# Patient Record
Sex: MALE | Race: WHITE | Employment: FULL TIME | URBAN - METROPOLITAN AREA
[De-identification: names, ages, dates, MRNs, and addresses within clinical notes are randomized per-mention and may not be internally consistent; named-entity substitution may affect disease eponyms.]

---

## 2017-01-05 ENCOUNTER — OFFICE VISIT (OUTPATIENT)
Dept: FAMILY MEDICINE | Facility: CLINIC | Age: 27
End: 2017-01-05
Payer: COMMERCIAL

## 2017-01-05 VITALS
OXYGEN SATURATION: 95 % | WEIGHT: 180 LBS | HEIGHT: 71 IN | TEMPERATURE: 97.8 F | DIASTOLIC BLOOD PRESSURE: 70 MMHG | BODY MASS INDEX: 25.2 KG/M2 | HEART RATE: 97 BPM | SYSTOLIC BLOOD PRESSURE: 115 MMHG

## 2017-01-05 DIAGNOSIS — J01.10 ACUTE FRONTAL SINUSITIS, RECURRENCE NOT SPECIFIED: ICD-10-CM

## 2017-01-05 DIAGNOSIS — F98.8 ADD (ATTENTION DEFICIT DISORDER): ICD-10-CM

## 2017-01-05 DIAGNOSIS — F41.1 GAD (GENERALIZED ANXIETY DISORDER): Primary | ICD-10-CM

## 2017-01-05 DIAGNOSIS — F51.04 PSYCHOPHYSIOLOGICAL INSOMNIA: ICD-10-CM

## 2017-01-05 DIAGNOSIS — F33.1 MODERATE EPISODE OF RECURRENT MAJOR DEPRESSIVE DISORDER (H): ICD-10-CM

## 2017-01-05 PROCEDURE — 36415 COLL VENOUS BLD VENIPUNCTURE: CPT | Performed by: INTERNAL MEDICINE

## 2017-01-05 PROCEDURE — 99203 OFFICE O/P NEW LOW 30 MIN: CPT | Performed by: INTERNAL MEDICINE

## 2017-01-05 PROCEDURE — 84443 ASSAY THYROID STIM HORMONE: CPT | Performed by: INTERNAL MEDICINE

## 2017-01-05 RX ORDER — ALPRAZOLAM 0.5 MG
0.5 TABLET ORAL 3 TIMES DAILY PRN
Qty: 60 TABLET | Refills: 0 | Status: SHIPPED | OUTPATIENT
Start: 2017-01-05 | End: 2017-02-07 | Stop reason: DRUGHIGH

## 2017-01-05 RX ORDER — DULOXETIN HYDROCHLORIDE 30 MG/1
CAPSULE, DELAYED RELEASE ORAL
Qty: 60 CAPSULE | Refills: 1 | Status: SHIPPED | OUTPATIENT
Start: 2017-01-05 | End: 2017-02-07 | Stop reason: DRUGHIGH

## 2017-01-05 RX ORDER — LORAZEPAM 1 MG/1
0.5-1 TABLET ORAL
Qty: 30 TABLET | Refills: 0 | Status: SHIPPED | OUTPATIENT
Start: 2017-01-05 | End: 2017-03-16

## 2017-01-05 RX ORDER — LISDEXAMFETAMINE DIMESYLATE 30 MG/1
30 CAPSULE ORAL DAILY
Qty: 30 CAPSULE | Refills: 0 | Status: SHIPPED | OUTPATIENT
Start: 2017-01-05 | End: 2017-02-07 | Stop reason: DRUGHIGH

## 2017-01-05 ASSESSMENT — ENCOUNTER SYMPTOMS
SORE THROAT: 0
DEPRESSION: 1
COUGH: 0
HALLUCINATIONS: 0
TREMORS: 0
INSOMNIA: 1
CHILLS: 0
PALPITATIONS: 0
WEIGHT LOSS: 0
NERVOUS/ANXIOUS: 1
SHORTNESS OF BREATH: 0
FEVER: 0

## 2017-01-05 ASSESSMENT — ANXIETY QUESTIONNAIRES
GAD7 TOTAL SCORE: 18
3. WORRYING TOO MUCH ABOUT DIFFERENT THINGS: NEARLY EVERY DAY
6. BECOMING EASILY ANNOYED OR IRRITABLE: NEARLY EVERY DAY
IF YOU CHECKED OFF ANY PROBLEMS ON THIS QUESTIONNAIRE, HOW DIFFICULT HAVE THESE PROBLEMS MADE IT FOR YOU TO DO YOUR WORK, TAKE CARE OF THINGS AT HOME, OR GET ALONG WITH OTHER PEOPLE: EXTREMELY DIFFICULT
1. FEELING NERVOUS, ANXIOUS, OR ON EDGE: NEARLY EVERY DAY
7. FEELING AFRAID AS IF SOMETHING AWFUL MIGHT HAPPEN: SEVERAL DAYS
2. NOT BEING ABLE TO STOP OR CONTROL WORRYING: NEARLY EVERY DAY
5. BEING SO RESTLESS THAT IT IS HARD TO SIT STILL: MORE THAN HALF THE DAYS

## 2017-01-05 ASSESSMENT — PATIENT HEALTH QUESTIONNAIRE - PHQ9: 5. POOR APPETITE OR OVEREATING: NEARLY EVERY DAY

## 2017-01-05 ASSESSMENT — LIFESTYLE VARIABLES: SUBSTANCE_ABUSE: 0

## 2017-01-05 NOTE — PROGRESS NOTES
"HPI Comments:   Patient has had an estimated 4 year history of generalized anxiety for which he is currently on 60 mg of daily citalopram. He feels that the medication is not working well enough for him. He still has a very difficult time with public speaking. His anxiety also keeps him up at night.    When he is with people, he projects himself very well but when he is alone he said that he feels depressed. Denies suicidal ideation.    Also has a history of attention deficit disorder which is currently responding well to Vyvanse. Has tried Adderall in the past but this made his anxiety worse.    Also complains of right frontal sinus pain with nasal congestion for the past couple weeks. Denies cough or shortness of breath.  Sinus Problem   This is a new problem. The current episode started more than 1 week ago. The problem has not changed since onset.There has been no fever. The pain is moderate. The pain has been constant since onset. Associated symptoms include congestion. Pertinent negatives include no chills, no sore throat, no cough and no shortness of breath.       Past Medical History   Diagnosis Date     Anxiety      Depressive disorder      ADD (attention deficit disorder) 1/5/2017     Moderate episode of recurrent major depressive disorder (H) 1/5/2017       Review of Systems   Constitutional: Positive for malaise/fatigue (from lack of sleep). Negative for fever, chills and weight loss.   HENT: Positive for congestion. Negative for sore throat.    Respiratory: Negative for cough and shortness of breath.    Cardiovascular: Negative for chest pain and palpitations.   Neurological: Negative for tremors.   Psychiatric/Behavioral: Positive for depression. Negative for suicidal ideas, hallucinations and substance abuse. The patient is nervous/anxious and has insomnia.        /70 mmHg  Pulse 97  Temp(Src) 97.8  F (36.6  C) (Tympanic)  Ht 5' 11\" (1.803 m)  Wt 180 lb (81.647 kg)  BMI 25.12 kg/m2  SpO2 " 95%      Physical Exam   Constitutional: He is oriented to person, place, and time. No distress.   Neck: No thyromegaly present.   Cardiovascular: Normal rate, regular rhythm and normal heart sounds.    Pulmonary/Chest: Effort normal and breath sounds normal. No respiratory distress.   Neurological: He is alert and oriented to person, place, and time. GCS score is 15.   Psychiatric: Affect normal.   Anxious appearing   Vitals reviewed.        ICD-10-CM    1. ROSANA (generalized anxiety disorder) F41.1 ALPRAZolam (XANAX) 0.5 MG tablet     DULoxetine (CYMBALTA) 30 MG EC capsule     TSH with free T4 reflex   2. ADD (attention deficit disorder) F98.8 lisdexamfetamine (VYVANSE) 30 MG capsule   3. Moderate episode of recurrent major depressive disorder (H) F33.1 DULoxetine (CYMBALTA) 30 MG EC capsule   4. Psychophysiological insomnia F51.04 LORazepam (ATIVAN) 1 MG tablet   5. Acute frontal sinusitis, recurrence not specified J01.10 amoxicillin-clavulanate (AUGMENTIN) 875-125 MG per tablet         Patient Instructions   Stop Trazodone.  Wean off Citalopram as follows: 2 tablets once a day for 1 week, then 1 tablet once a day for 1 week, then 1 tablet every other day for 1 week, then stop.  Take new prescribed medication (Cymbalta) for your anxiety/depression as directed.  Call doctor if you develop any side effects from the medication/s.  Call doctor if your anxiety/depression persists/worsens, or if you develop new symptoms.    Follow up in 1 month.

## 2017-01-05 NOTE — PROGRESS NOTES
"  SUBJECTIVE:     CC: Kristen Hernandes is an 26 year old male who presents for preventative health visit.     Healthy Habits:    Do you get at least three servings of calcium containing foods daily (dairy, green leafy vegetables, etc.)? {YES/NO, DAIRY INTAKE:190673::\"yes\"}    Amount of exercise or daily activities, outside of work: {AMOUNT EXERCISE:012966}    Problems taking medications regularly {Yes /No default:416345::\"No\"}    Medication side effects: {Yes /No default.:026487::\"No\"}    Have you had an eye exam in the past two years? {YESNOBLANK:284255}    Do you see a dentist twice per year? {YESNOBLANK:815169}    Do you have sleep apnea, excessive snoring or daytime drowsiness?{YESNOBLANK:983292}    {Outside tests to abstract? :306247}    {additional problems to add:312434}    Today's PHQ-2 Score: No flowsheet data found.  {PHQ-2 LOOK IN ASSESSMENTS :313808}  Abuse: Current or Past(Physical, Sexual or Emotional)- {YES/NO/NA:357486}  Do you feel safe in your environment - {YES/NO/NA:688007}    Social History   Substance Use Topics     Smoking status: Never Smoker      Smokeless tobacco: Current User     Alcohol Use: Yes     {ETOH AUDIT:931617}    Last PSA: No results found for: PSA    No results for input(s): CHOL, HDL, LDL, TRIG, CHOLHDLRATIO, NHDL in the last 32775 hours.    Reviewed orders with patient. Reviewed health maintenance and updated orders accordingly - {Yes/No:440805::\"Yes\"}    All Histories reviewed and updated in Epic.  {HISTORY OPTIONS:647224}    ROS:  {MALE ROS-adult preventive care package:238204::\"C: NEGATIVE for fever, chills, change in weight\",\"I: NEGATIVE for worrisome rashes, moles or lesions\",\"E: NEGATIVE for vision changes or irritation\",\"ENT: NEGATIVE for ear, mouth and throat problems\",\"R: NEGATIVE for significant cough or SOB\",\"CV: NEGATIVE for chest pain, palpitations or peripheral edema\",\"GI: NEGATIVE for nausea, abdominal pain, heartburn, or change in bowel habits\",\" male: negative " "for dysuria, hematuria, decreased urinary stream, erectile dysfunction, urethral discharge\",\"M: NEGATIVE for significant arthralgias or myalgia\",\"N: NEGATIVE for weakness, dizziness or paresthesias\",\"P: NEGATIVE for changes in mood or affect\"}    {CHRONICPROBDATA:420541}  OBJECTIVE:     There were no vitals taken for this visit.  EXAM:  {Exam Choices:234761}    ASSESSMENT/PLAN:     {Diag Picklist:408615}    COUNSELING:  {MALE COUNSELING MESSAGES:884449::\"Reviewed preventive health counseling, as reflected in patient instructions\"}    {Blood Pressure - Adult Preventive:334199}     reports that he has never smoked. He uses smokeless tobacco.  {Tobacco Cessation needed for ACO -- Delete if patient is a non-smoker:266419}  Estimated body mass index is 25.12 kg/(m^2) as calculated from the following:    Height as of 12/17/16: 5' 11\" (1.803 m).    Weight as of 12/17/16: 180 lb (81.647 kg).   {Weight Management Plan needed for ACO:343164}    Counseling Resources:  ATP IV Guidelines  Pooled Cohorts Equation Calculator  FRAX Risk Assessment  ICSI Preventive Guidelines  Dietary Guidelines for Americans, 2010  USDA's MyPlate  ASA Prophylaxis  Lung CA Screening    Alejandro Wells MD  [facility]  "

## 2017-01-05 NOTE — Clinical Note
[facility]  [address] [facility]  [address]  [place]  [address]  Tel: [phone]    January 6, 2017    Kristen Hernandes  [address] [facility]   [place] [address]          Good day to you Mr. Hernandes.    Your thyroid function test is normal.      Sincerely,    Alejandro Wells MD/alfredito    Results for orders placed or performed in visit on 01/05/17   TSH with free T4 reflex   Result Value Ref Range    TSH 0.86 0.40 - 4.00 mU/L           Enclosure: Lab Results

## 2017-01-05 NOTE — PATIENT INSTRUCTIONS
Stop Trazodone.  Wean off Citalopram as follows: 2 tablets once a day for 1 week, then 1 tablet once a day for 1 week, then 1 tablet every other day for 1 week, then stop.  Take new prescribed medication (Cymbalta) for your anxiety/depression as directed.  Call doctor if you develop any side effects from the medication/s.  Call doctor if your anxiety/depression persists/worsens, or if you develop new symptoms.    Follow up in 1 month.

## 2017-01-05 NOTE — NURSING NOTE
"Chief Complaint   Patient presents with     Establish Care     Anxiety     pt states that he does have very high anxiety and would like to discuss it       Initial /70 mmHg  Pulse 97  Temp(Src) 97.8  F (36.6  C) (Tympanic)  Ht 5' 11\" (1.803 m)  Wt 180 lb (81.647 kg)  BMI 25.12 kg/m2  SpO2 95% Estimated body mass index is 25.12 kg/(m^2) as calculated from the following:    Height as of this encounter: 5' 11\" (1.803 m).    Weight as of this encounter: 180 lb (81.647 kg).  BP completed using cuff size: large, left arm.  Kiara Conley MA    "

## 2017-01-05 NOTE — MR AVS SNAPSHOT
After Visit Summary   1/5/2017    Kristen Hernandes    MRN: [id number]           Patient Information     Date Of Birth          1990        Visit Information        Provider Department      1/5/2017 1:00 PM Alejandro Wells MD [facility]        Today's Diagnoses     ROSANA (generalized anxiety disorder)    -  1     ADD (attention deficit disorder)         Moderate episode of recurrent major depressive disorder (H)         Psychophysiological insomnia         Acute frontal sinusitis, recurrence not specified           Care Instructions    Stop Trazodone.  Wean off Citalopram as follows: 2 tablets once a day for 1 week, then 1 tablet once a day for 1 week, then 1 tablet every other day for 1 week, then stop.  Take new prescribed medication (Cymbalta) for your anxiety/depression as directed.  Call doctor if you develop any side effects from the medication/s.  Call doctor if your anxiety/depression persists/worsens, or if you develop new symptoms.    Follow up in 1 month.        Follow-ups after your visit        Who to contact     If you have questions or need follow up information about today's clinic visit or your schedule please contact [facility] directly at [phone].  Normal or non-critical lab and imaging results will be communicated to you by MyChart, letter or phone within 4 business days after the clinic has received the results. If you do not hear from us within 7 days, please contact the clinic through [employer] or phone. If you have a critical or abnormal lab result, we will notify you by phone as soon as possible.  Submit refill requests through [employer] or call your pharmacy and they will forward the refill request to us. Please allow 3 business days for your refill to be completed.          Additional Information About Your Visit        MyChart Information     Prowl lets you send messages to your doctor, view your test results, renew your  "prescriptions, schedule appointments and more. To sign up, go to [email or web address] . Click on \"Log in\" on the left side of the screen, which will take you to the Welcome page. Then click on \"Sign up Now\" on the right side of the page.     You will be asked to enter the access code listed below, as well as some personal information. Please follow the directions to create your username and password.     Your access code is: [email or web address]  Expires: 2017  5:31 AM     Your access code will  in 90 days. If you need help or a new code, please call your [facility] clinic or [phone].        Care EveryWhere ID     This is your Care EveryWhere ID. This could be used by other organizations to access your [facility] medical records  [id number]        Your Vitals Were     Pulse Temperature Height BMI (Body Mass Index) Pulse Oximetry       97 97.8  F (36.6  C) (Tympanic) 5' 11\" (1.803 m) 25.12 kg/m2 95%        Blood Pressure from Last 3 Encounters:   17 115/70   16 126/64   10/15/16 114/74    Weight from Last 3 Encounters:   17 180 lb (81.647 kg)   16 180 lb (81.647 kg)   10/15/16 180 lb (81.647 kg)              Today, you had the following     No orders found for display         Today's Medication Changes          These changes are accurate as of: 17  1:36 PM.  If you have any questions, ask your nurse or doctor.               Start taking these medicines.        Dose/Directions    ALPRAZolam 0.5 MG tablet   Commonly known as:  XANAX   Used for:  ROSANA (generalized anxiety disorder)   Started by:  Alejandro Wells MD        Dose:  0.5 mg   Take 1 tablet (0.5 mg) by mouth 3 times daily as needed for anxiety   Quantity:  60 tablet   Refills:  0       amoxicillin-clavulanate 875-125 MG per tablet   Commonly known as:  AUGMENTIN   Used for:  Acute frontal sinusitis, recurrence not specified   Started by:  Alejandro Wells MD        Dose:  1 tablet   Take 1 " tablet by mouth 2 times daily for 10 days   Quantity:  20 tablet   Refills:  0       DULoxetine 30 MG EC capsule   Commonly known as:  CYMBALTA   Used for:  ROSANA (generalized anxiety disorder), Moderate episode of recurrent major depressive disorder (H)   Started by:  Alejandro Wells MD        1 capsule daily for 1 week, then 2 capsules daily   Quantity:  60 capsule   Refills:  1       LORazepam 1 MG tablet   Commonly known as:  ATIVAN   Used for:  Psychophysiological insomnia   Started by:  Alejandro Wells MD        Dose:  0.5-1 mg   Take 0.5-1 tablets (0.5-1 mg) by mouth nightly as needed for anxiety Take 30 minutes prior to departure.  Do not operate a vehicle after taking this medication   Quantity:  30 tablet   Refills:  0         Stop taking these medicines if you haven't already. Please contact your care team if you have questions.     CITALOPRAM HYDROBROMIDE PO   Stopped by:  Alejandro Wells MD           TRAZODONE HCL PO   Stopped by:  Alejandro Wells MD                Where to get your medicines      These medications [facility] [address] [place] [address] [address] [facility]  [address] [place] [id number]    Hours:  24-hours Phone:  [phone]    - amoxicillin-clavulanate 875-125 MG per tablet  - DULoxetine 30 MG EC capsule      Some of these will need a paper prescription and others can be bought over the counter.  Ask your nurse if you have questions.     Bring a paper prescription for each of these medications    - ALPRAZolam 0.5 MG tablet  - lisdexamfetamine 30 MG capsule  - LORazepam 1 MG tablet             Primary Care Provider    Rylan Sanchez MD       No address on file        Thank you!     Thank you for choosing [facility]  for your care. Our goal is always to provide you with excellent care. Hearing back from our patients is one way we can continue to improve our  services. Please take a few minutes to complete the written survey that you may receive in the mail after your visit with us. Thank you!             Your Updated Medication List - Protect others around you: Learn how to safely use, store and throw away your medicines at www.disposemymeds.org.          This list is accurate as of: 1/5/17  1:36 PM.  Always use your most recent med list.                   Brand Name Dispense Instructions for use    ALPRAZolam 0.5 MG tablet    XANAX    60 tablet    Take 1 tablet (0.5 mg) by mouth 3 times daily as needed for anxiety       amoxicillin-clavulanate 875-125 MG per tablet    AUGMENTIN    20 tablet    Take 1 tablet by mouth 2 times daily for 10 days       DULoxetine 30 MG EC capsule    CYMBALTA    60 capsule    1 capsule daily for 1 week, then 2 capsules daily       lisdexamfetamine 30 MG capsule    VYVANSE    30 capsule    Take 1 capsule (30 mg) by mouth daily       loratadine 10 MG tablet    CLARITIN     Take 10 mg by mouth daily       LORazepam 1 MG tablet    ATIVAN    30 tablet    Take 0.5-1 tablets (0.5-1 mg) by mouth nightly as needed for anxiety Take 30 minutes prior to departure.  Do not operate a vehicle after taking this medication

## 2017-01-06 LAB — TSH SERPL DL<=0.005 MIU/L-ACNC: 0.86 MU/L (ref 0.4–4)

## 2017-01-06 ASSESSMENT — ANXIETY QUESTIONNAIRES: GAD7 TOTAL SCORE: 18

## 2017-01-06 ASSESSMENT — PATIENT HEALTH QUESTIONNAIRE - PHQ9: SUM OF ALL RESPONSES TO PHQ QUESTIONS 1-9: 18

## 2017-01-15 ENCOUNTER — HOSPITAL ENCOUNTER (EMERGENCY)
Facility: CLINIC | Age: 27
Discharge: HOME | End: 2017-01-15
Attending: EMERGENCY MEDICINE | Admitting: EMERGENCY MEDICINE
Payer: COMMERCIAL

## 2017-01-15 VITALS
OXYGEN SATURATION: 98 % | SYSTOLIC BLOOD PRESSURE: 120 MMHG | TEMPERATURE: 98.1 F | HEIGHT: 71 IN | RESPIRATION RATE: 18 BRPM | DIASTOLIC BLOOD PRESSURE: 95 MMHG

## 2017-01-15 DIAGNOSIS — Z78.9 ALCOHOL USE: ICD-10-CM

## 2017-01-15 DIAGNOSIS — R42 DIZZINESS: ICD-10-CM

## 2017-01-15 LAB
ANION GAP SERPL CALCULATED.3IONS-SCNC: 8 MMOL/L (ref 3–14)
BUN SERPL-MCNC: 11 MG/DL (ref 7–30)
CALCIUM SERPL-MCNC: 8.7 MG/DL (ref 8.5–10.1)
CHLORIDE SERPL-SCNC: 105 MMOL/L (ref 94–109)
CO2 SERPL-SCNC: 27 MMOL/L (ref 20–32)
CREAT SERPL-MCNC: 0.94 MG/DL (ref 0.66–1.25)
ETHANOL SERPL-MCNC: 0.14 G/DL
GFR SERPL CREATININE-BSD FRML MDRD: NORMAL ML/MIN/1.7M2
GLUCOSE BLDC GLUCOMTR-MCNC: 84 MG/DL (ref 70–99)
GLUCOSE SERPL-MCNC: 98 MG/DL (ref 70–99)
POTASSIUM SERPL-SCNC: 3.5 MMOL/L (ref 3.4–5.3)
SODIUM SERPL-SCNC: 140 MMOL/L (ref 133–144)

## 2017-01-15 PROCEDURE — 96374 THER/PROPH/DIAG INJ IV PUSH: CPT

## 2017-01-15 PROCEDURE — 25000128 H RX IP 250 OP 636: Performed by: EMERGENCY MEDICINE

## 2017-01-15 PROCEDURE — 00000146 ZZHCL STATISTIC GLUCOSE BY METER IP

## 2017-01-15 PROCEDURE — 99284 EMERGENCY DEPT VISIT MOD MDM: CPT | Mod: 25

## 2017-01-15 PROCEDURE — 25000125 ZZHC RX 250: Performed by: EMERGENCY MEDICINE

## 2017-01-15 PROCEDURE — 80048 BASIC METABOLIC PNL TOTAL CA: CPT | Performed by: EMERGENCY MEDICINE

## 2017-01-15 PROCEDURE — 96361 HYDRATE IV INFUSION ADD-ON: CPT

## 2017-01-15 PROCEDURE — 80320 DRUG SCREEN QUANTALCOHOLS: CPT | Performed by: EMERGENCY MEDICINE

## 2017-01-15 RX ORDER — ONDANSETRON 2 MG/ML
4 INJECTION INTRAMUSCULAR; INTRAVENOUS
Status: DISCONTINUED | OUTPATIENT
Start: 2017-01-15 | End: 2017-01-15 | Stop reason: HOSPADM

## 2017-01-15 RX ADMIN — SODIUM CHLORIDE 1000 ML: 9 INJECTION, SOLUTION INTRAVENOUS at 02:59

## 2017-01-15 RX ADMIN — ONDANSETRON HYDROCHLORIDE 4 MG: 2 SOLUTION INTRAMUSCULAR; INTRAVENOUS at 02:59

## 2017-01-15 NOTE — ED PROVIDER NOTES
"  History     Chief Complaint:  \"I feel weird\"    HPI   Kristen Hernandes is a 26 year old male with a hx of sinus infection, currently on antibiotics day 10/10 of Augmentin, who presents with concern for \"feeling weird\" after drinking alcohol. He reports feeling anxious, nauseated and dizzy. He denies other substance use and feels intoxicated from alcohol.  He believes his symptoms are caused by a reaction to the antibiotic and alcohol. He took an Uber to the ED.  He states that his sinusitis symptoms have improved with treatment.    Allergies:  The patient has no known drug allergies.     Medications:    Amoxicillin-clavulanate  lisdexamfetamine  Alprazolam  Lorazepam  Duloxetine  Loratadine    Past Medical History:    ROSANA  Depressive disorder  ADHD   Psychophysiological insomnia    Past Surgical History:    Tonsillectomy    Family History:    No family history on file.    Social History:  Relationship status: single  Tobacco use: negative   Alcohol use: socially  The patient presents alone.      Review of Systems   All other systems reviewed and are negative.    Physical Exam   First Vitals:  /95 mmHg  Resp 18   SpO2 98%  P 109  Temp: 36.7    Physical Exam  General: well groomed male sitting upright  HENT: mucous membranes moist, OP clear, face and forehead nontender  Eyes: PERRL without nystagmus  CV: extremities well perfused, regular rhythm, mildly tachycardic without audible murmur  Resp: normal effort, clear throughout  GI: abdomen soft and nontender, no guarding  MSK: no bony tenderness   Skin: appropriately warm and dry  Neuro: alert, clear speech, oriented, normal tone in extremities, ambulatory  Psych: slightly anxious, cooperative, denies feeling suicidal, no evidence of hallucinations        Emergency Department Course     Laboratory:  Laboratory findings were communicated with the patient who voiced understanding of the findings.  EtOH level: 0.14  BMP: Creatinine 0.94    Interventions:  0259: " Zofran 4mg, IV  0259: NS 1,000 mL IV      Emergency Department Course:  Nursing notes and vitals reviewed.  I performed an exam of the patient as documented above.   The patient was placed on continuous pulse oximetry and cardiac monitoring.  A peripheral IV was established.  IV was inserted and blood was drawn for laboratory testing, results above.     At 0405 the patient was rechecked and was updated on the results of his laboratory studies. The patient feels comfortable and requests to be discharged.     I discussed the treatment plan with the patient. They expressed understanding of this plan and consented to discharge. They will be discharged home with instructions for care and follow up. In addition, the patient will return to the emergency department if their symptoms persist, worsen, if new symptoms arise or if there is any concern.  All questions were answered.    I personally reviewed the laboratory results with the patient and answered all related questions prior to discharge.    Impression & Plan      Medical Decision Making:  This 26 year old with history of anxiety who was put on Augmentin for sinusitis earlier this month, presents with concern for feeling weird after drinking a fair bit of alcohol while out with friends. He is concerned about possible interaction with Augmentin although I think a serious specific reaction or interaction is unlikely after reviewing an electronic medical reference for this drug. His exam is benign, he initially felt slightly drunk but is acting calm and cooperatively and is clinically sober such that I trust him to go home in an er cab tonight. Screening electrolytes are benign and there is no sign of metabolic derangement contributing to his symptoms. No focal neurologic signs or symptoms to suggest an acute neurovascular process such as stroke. He is on a sinus rhythm on the monitor.  Symptoms are not orthostatic.  I think that he can safely be discharged and he  fully agrees.    Diagnosis:    ICD-10-CM    1. Dizziness R42    2. Alcohol use Z78.9        Disposition:   Discharge home    Discharge Medications:  New Prescriptions    No medications on file       Scribe Disclosure:  I, Therese Isaac, am serving as a scribe at 2:41 AM on 1/15/2017 to document services personally performed by Brian Gunderson MD, based on my observations and the provider's statements to me.        Brian Gunderson MD  01/15/17 0546

## 2017-01-15 NOTE — ED AVS SNAPSHOT
Emergency Department    [address] [facility] [address]    Phone:  [phone]    Fax:  [phone]                                       Kristen Hernandes   MRN: [id number]    Department:   Emergency Department   Date of Visit:  1/15/2017           After Visit Summary Signature Page     I have received my discharge instructions, and my questions have been answered. I have discussed any challenges I see with this plan with the nurse or doctor.    ..........................................................................................................................................  Patient/Patient Representative Signature      ..........................................................................................................................................  Patient Representative Print Name and Relationship to Patient    ..................................................               ................................................  Date                                            Time    ..........................................................................................................................................  Reviewed by Signature/Title    ...................................................              ..............................................  Date                                                            Time

## 2017-01-15 NOTE — ED AVS SNAPSHOT
Emergency Department    [address] [facility] [address]    Phone:  [phone]    Fax:  [phone]                                       Kristen Hernandes   MRN: [id number]    Department:   Emergency Department   Date of Visit:  1/15/2017           Patient Information     Date Of Birth          1990        Your diagnoses for this visit were:     Dizziness     Alcohol use        You were seen by Brian Gunderson MD.      Follow-up Information     Call your regular clinic.    Why:  As needed        Follow up with  Emergency Department.    Specialty:  EMERGENCY MEDICINE    Why:  As needed for crisis    Contact information:    [address] [facility] [address] [phone]      Discharge References/Attachments     ALCOHOL INTOXICATION (ENGLISH)      Future Appointments        Provider Department Dept Phone Center    2/7/2017 11:00 AM Alejandro Wells MD [facility] [phone]       24 Hour Appointment Hotline       To make an appointment [facility], call [phone] ([phone]). If you don't have a family doctor or clinic, we will help you find one. [facility] are conveniently located to serve the needs of you and your family.             Review of your medicines      Our records show that you are taking the medicines listed below. If these are incorrect, please call your family doctor or clinic.        Dose / Directions Last dose taken    ALPRAZolam 0.5 MG tablet   Commonly known as:  XANAX   Dose:  0.5 mg   Quantity:  60 tablet        Take 1 tablet (0.5 mg) by mouth 3 times daily as needed for anxiety   Refills:  0        * AUGMENTIN 875-125 MG per tablet   Dose:  1 tablet   Generic drug:  amoxicillin-clavulanate        Take 1 tablet by mouth 2 times daily   Refills:  0        * amoxicillin-clavulanate 875-125 MG per tablet   Commonly known as:  AUGMENTIN   Dose:  1 tablet   Quantity:  20 tablet        Take 1  tablet by mouth 2 times daily for 10 days   Refills:  0        DULoxetine 30 MG EC capsule   Commonly known as:  CYMBALTA   Quantity:  60 capsule        1 capsule daily for 1 week, then 2 capsules daily   Refills:  1        lisdexamfetamine 30 MG capsule   Commonly known as:  VYVANSE   Dose:  30 mg   Quantity:  30 capsule        Take 1 capsule (30 mg) by mouth daily   Refills:  0        loratadine 10 MG tablet   Commonly known as:  CLARITIN   Dose:  10 mg        Take 10 mg by mouth daily   Refills:  0        LORazepam 1 MG tablet   Commonly known as:  ATIVAN   Dose:  0.5-1 mg   Quantity:  30 tablet        Take 0.5-1 tablets (0.5-1 mg) by mouth nightly as needed for anxiety Take 30 minutes prior to departure.  Do not operate a vehicle after taking this medication   Refills:  0        * Notice:  This list has 2 medication(s) that are the same as other medications prescribed for you. Read the directions carefully, and ask your doctor or other care provider to review them with you.            Procedures and tests performed during your visit     Alcohol ethyl    Basic metabolic panel    Glucose by meter      Orders Needing Specimen Collection     None      Pending Results     No orders found from 1/14/2017 to 1/16/2017.            Pending Culture Results     No orders found from 1/14/2017 to 1/16/2017.       Test Results from your hospital stay           1/15/2017  3:38 AM - Interface, "[employer] Results      Component Results     Component Value Ref Range & Units Status    Sodium 140 133 - 144 mmol/L Final    Potassium 3.5 3.4 - 5.3 mmol/L Final    Chloride 105 94 - 109 mmol/L Final    Carbon Dioxide 27 20 - 32 mmol/L Final    Anion Gap 8 3 - 14 mmol/L Final    Glucose 98 70 - 99 mg/dL Final    Urea Nitrogen 11 7 - 30 mg/dL Final    Creatinine 0.94 0.66 - 1.25 mg/dL Final    GFR Estimate >90  Non  GFR Calc   >60 mL/min/1.7m2 Final    GFR Estimate If Black >90   GFR Calc   >60 mL/min/1.7m2  Final    Calcium 8.7 8.5 - 10.1 mg/dL Final         1/15/2017  3:38 AM - Interface, [employer] Results      Component Results     Component Value Ref Range & Units Status    Ethanol g/dL 0.14 (H) <0.01 g/dL Final         1/15/2017  3:01 AM - Interface, [employer] Results      Component Results     Component Value Ref Range & Units Status    Glucose 84 70 - 99 mg/dL Final                Clinical Quality Measure: Blood Pressure Screening     Your blood pressure was checked while you were in the emergency department today. The last reading we obtained was  BP: (!) 120/95 mmHg . Please read the guidelines below about what these numbers mean and what you should do about them.  If your systolic blood pressure (the top number) is less than 120 and your diastolic blood pressure (the bottom number) is less than 80, then your blood pressure is normal. There is nothing more that you need to do about it.  If your systolic blood pressure (the top number) is 120-139 or your diastolic blood pressure (the bottom number) is 80-89, your blood pressure may be higher than it should be. You should have your blood pressure rechecked within a year by a primary care provider.  If your systolic blood pressure (the top number) is 140 or greater or your diastolic blood pressure (the bottom number) is 90 or greater, you may have high blood pressure. High blood pressure is treatable, but if left untreated over time it can put you at risk for heart attack, stroke, or kidney failure. You should have your blood pressure rechecked by a primary care provider within the next 4 weeks.  If your provider in the emergency department today gave you specific instructions to follow-up with your doctor or provider even sooner than that, you should follow that instruction and not wait for up to 4 weeks for your follow-up visit.        Thank you for choosing [facility]       Thank you for choosing [facility] for your care. Our goal is always to provide you with  "excellent care. Hearing back from our patients is one way we can continue to improve our services. Please take a few minutes to complete the written survey that you may receive in the mail after you visit with us. Thank you!        [employer] Information     [employer] lets you send messages to your doctor, view your test results, renew your prescriptions, schedule appointments and more. To sign up, go to [email or web address] . Click on \"Log in\" on the left side of the screen, which will take you to the Welcome page. Then click on \"Sign up Now\" on the right side of the page.     You will be asked to enter the access code listed below, as well as some personal information. Please follow the directions to create your username and password.     Your access code is: [email or web address]  Expires: 4/15/2017  4:18 AM     Your access code will  in 90 days. If you need help or a new code, please call your [place] clinic or [phone].        Care EveryWhere ID     This is your Care EveryWhere ID. This could be used by other organizations to access your [facility] medical records  [id number]        After Visit Summary       This is your record. Keep this with you and show to your community pharmacist(s) and doctor(s) at your next visit.                  "

## 2017-01-21 ENCOUNTER — HOSPITAL ENCOUNTER (EMERGENCY)
Facility: CLINIC | Age: 27
Discharge: HOME | End: 2017-01-21
Attending: EMERGENCY MEDICINE | Admitting: EMERGENCY MEDICINE
Payer: COMMERCIAL

## 2017-01-21 VITALS
TEMPERATURE: 98 F | WEIGHT: 180 LBS | RESPIRATION RATE: 18 BRPM | HEART RATE: 70 BPM | OXYGEN SATURATION: 98 % | HEIGHT: 71 IN | SYSTOLIC BLOOD PRESSURE: 122 MMHG | DIASTOLIC BLOOD PRESSURE: 70 MMHG | BODY MASS INDEX: 25.2 KG/M2

## 2017-01-21 DIAGNOSIS — R11.0 NAUSEA: ICD-10-CM

## 2017-01-21 DIAGNOSIS — Z78.9 ALCOHOL USE: ICD-10-CM

## 2017-01-21 DIAGNOSIS — E86.0 DEHYDRATION: ICD-10-CM

## 2017-01-21 DIAGNOSIS — R42 DIZZINESS: ICD-10-CM

## 2017-01-21 LAB
ALCOHOL BREATH TEST: 0.09 (ref 0–0.01)
INTERPRETATION ECG - MUSE: NORMAL

## 2017-01-21 PROCEDURE — 82075 ASSAY OF BREATH ETHANOL: CPT

## 2017-01-21 PROCEDURE — 93005 ELECTROCARDIOGRAM TRACING: CPT

## 2017-01-21 PROCEDURE — 99284 EMERGENCY DEPT VISIT MOD MDM: CPT

## 2017-01-21 PROCEDURE — 25000125 ZZHC RX 250: Performed by: EMERGENCY MEDICINE

## 2017-01-21 RX ORDER — ONDANSETRON 4 MG/1
4 TABLET, ORALLY DISINTEGRATING ORAL ONCE
Status: COMPLETED | OUTPATIENT
Start: 2017-01-21 | End: 2017-01-21

## 2017-01-21 RX ORDER — ONDANSETRON 4 MG/1
4 TABLET, ORALLY DISINTEGRATING ORAL EVERY 8 HOURS PRN
Qty: 10 TABLET | Refills: 0 | Status: SHIPPED | OUTPATIENT
Start: 2017-01-21 | End: 2017-01-24

## 2017-01-21 RX ADMIN — ONDANSETRON 4 MG: 4 TABLET, ORALLY DISINTEGRATING ORAL at 02:00

## 2017-01-21 ASSESSMENT — ENCOUNTER SYMPTOMS
ABDOMINAL PAIN: 0
APPETITE CHANGE: 1
SHORTNESS OF BREATH: 0
DIZZINESS: 1
NAUSEA: 1
VOMITING: 0
LIGHT-HEADEDNESS: 1

## 2017-01-21 NOTE — ED NOTES
Orthostatics done on pt. Results charted in orthostatic vitals and MD notified of results. Pt steady on feet. Pt hooked up to continuous cardiac monitor. Pt given blanket and call light. Denies any further needs at this time.

## 2017-01-21 NOTE — ED AVS SNAPSHOT
Emergency Department    [address] [facility] [address]    Phone:  [phone]    Fax:  [phone]                                       Kristen Hernandes   MRN: [id number]    Department:   Emergency Department   Date of Visit:  1/21/2017           After Visit Summary Signature Page     I have received my discharge instructions, and my questions have been answered. I have discussed any challenges I see with this plan with the nurse or doctor.    ..........................................................................................................................................  Patient/Patient Representative Signature      ..........................................................................................................................................  Patient Representative Print Name and Relationship to Patient    ..................................................               ................................................  Date                                            Time    ..........................................................................................................................................  Reviewed by Signature/Title    ...................................................              ..............................................  Date                                                            Time

## 2017-01-21 NOTE — ED AVS SNAPSHOT
Emergency Department    [address] [facility] [address]    Phone:  [phone]    Fax:  [phone]                                       Kristen Hernandes   MRN: [id number]    Department:   Emergency Department   Date of Visit:  1/21/2017           Patient Information     Date Of Birth          1990        Your diagnoses for this visit were:     Alcohol use     Dehydration     Nausea     Dizziness        You were seen by Shane Mccarthy MD.      Follow-up Information     Follow up with Sohail Savage MD. Schedule an appointment as soon as possible for a visit in 1 week.    Specialty:  Family Practice    Why:  your doctor or Dr. Savage next week if not feeling better    Contact information:    [employer]  [address]  [facility] [address] [phone]          Follow up with  Emergency Department.    Specialty:  EMERGENCY MEDICINE    Why:  If symptoms worsen    Contact information:    [address] [facility] [address] [phone]        Discharge Instructions         Dehydration (Adult)  Dehydration occurs when your body loses too much fluid. This may be the result of prolonged vomiting or diarrhea, excessive sweating, or a high fever. It may also happen if you don t drink enough fluid when you re sick or out in the heat. Misuse of diuretics (water pills) can also be a cause.  Symptoms include thirst and decreased urine output. You may also feel dizzy, weak, fatigued, or very drowsy. The diet described below is usually enough to treat dehydration. In some cases, you may need medicine.  Home care    Drink at least 12 8-ounce glasses of fluid every day to resolve the dehydration. Fluid may include water; orange juice; lemonade; apple, grape, or cranberry juice; clear fruit drinks; electrolyte replacement and sports drinks; and teas and coffee without caffeine. If you have been diagnosed with a kidney disease, ask your doctor how much and what types of fluids  you should drink to prevent dehydration. If you have kidney disease, fluid can build up in the body. This can be dangerous to your health.     If you have a fever, muscle aches, or a headache as a result of a cold or flu, you may take acetaminophen or ibuprofen, unless another medicine was prescribed. If you have chronic liver or kidney disease, or have ever had a stomach ulcer or gastrointestinal bleeding, talk with your health care provider before using these medicines. Don't take aspirin if you are younger than 18 and have a fever. Aspirin raises the chance for severe liver injury.  Follow-up care  Follow up with your health care provider, or as advised.  When to seek medical advice  Call your health care provider right away if any of these occur:    Continued vomiting    Frequent diarrhea (more than 5 times a day); blood (red or black color) or mucus in diarrhea    Blood in vomit or stool    Swollen abdomen or increasing abdominal pain    Weakness, dizziness, or fainting    Unusual drowsiness or confusion    Reduced urine output or extreme thirst    Fever of 100.4 F (34 C) or higher     0116-3353 [employer]. [address] [place] [address]. All rights reserved. This information is not intended as a substitute for professional medical care. Always follow your healthcare professional's instructions.          Possible Causes of Dizziness or Fainting  Dizziness and fainting can have many causes. Below are some examples of possible causes your health care provider will look to rule out.    Benign Paroxysmal Positional Vertigo (BPPV)  BPPV results when calcium crystals inside the inner ear shift into the wrong position. BPPV causes episodes of vertigo (a spinning sensation). Episodes most often occur when the head is moved in a certain way.  Infection or Inflammation  The semicircular canals of the ear may become infected or inflamed. In this case, they can send the wrong balance signals. This  can cause vertigo.  Meniere s Disease  Meniere s disease happens when there is too much fluid in the semicircular canals. This can cause vertigo. It also can cause hearing problems and buzzing or ringing in the ears (called tinnitus). You may also have a feeling of pressure or fullness in the ear.  Syncope  Syncope is fainting that occurs when the brain doesn t get enough oxygen-rich blood. It can be caused by low heart rate or low blood pressure. This is called vasovagal syncope. It can also be caused by sitting or standing up too quickly. This is called orthostatic hypotension. Syncope may also be due to a heart valve problem, an abnormal heart rhythm, or other heart problems. Dizziness can also occur from stroke, hemorrhage in the brain, or other problems in the brain. Your doctor may do certain tests to rule out these conditions.  Other Causes  Other causes include:    Medications. Certain medications can cause dizziness and even fainting. In some cases, stopping a medication too quickly can lead to withdrawal symptoms, including dizziness and fainting.    Anxiety. Being anxious can lead to breathing changes, such as hyperventilation. These can lead to dizziness and fainting.  Additional causes for dizziness and fainting also exist. Talk to your doctor for more information.          8792-4315 [employer]. [address] [place] [address], [place] [address]. All rights reserved. This information is not intended as a substitute for professional medical care. Always follow your healthcare professional's instructions.          Discharge References/Attachments     NAUSEA AND VOMITING, HOW TO CONTROL (ENGLISH)    ALCOHOL INTOXICATION (ENGLISH)      Future Appointments        Provider Department Dept Phone Center    2/7/2017 11:00 AM Alejandro Wells MD [facility] [phone]       24 Hour Appointment Hotline       To make an appointment at any [facility], call [phone]  ([phone]). If you don't have a family doctor or clinic, we will help you find one. [facility] clinics are conveniently located to serve the needs of you and your family.             Review of your medicines      START taking        Dose / Directions Last dose taken    ondansetron 4 MG ODT tab   Commonly known as:  ZOFRAN ODT   Dose:  4 mg   Quantity:  10 tablet        Take 1 tablet (4 mg) by mouth every 8 hours as needed for nausea   Refills:  0          Our records show that you are taking the medicines listed below. If these are incorrect, please call your family doctor or clinic.        Dose / Directions Last dose taken    ALPRAZolam 0.5 MG tablet   Commonly known as:  XANAX   Dose:  0.5 mg   Quantity:  60 tablet        Take 1 tablet (0.5 mg) by mouth 3 times daily as needed for anxiety   Refills:  0        AUGMENTIN 875-125 MG per tablet   Dose:  1 tablet   Generic drug:  amoxicillin-clavulanate        Take 1 tablet by mouth 2 times daily   Refills:  0        DULoxetine 30 MG EC capsule   Commonly known as:  CYMBALTA   Quantity:  60 capsule        1 capsule daily for 1 week, then 2 capsules daily   Refills:  1        lisdexamfetamine 30 MG capsule   Commonly known as:  VYVANSE   Dose:  30 mg   Quantity:  30 capsule        Take 1 capsule (30 mg) by mouth daily   Refills:  0        loratadine 10 MG tablet   Commonly known as:  CLARITIN   Dose:  10 mg        Take 10 mg by mouth daily   Refills:  0        LORazepam 1 MG tablet   Commonly known as:  ATIVAN   Dose:  0.5-1 mg   Quantity:  30 tablet        Take 0.5-1 tablets (0.5-1 mg) by mouth nightly as needed for anxiety Take 30 minutes prior to departure.  Do not operate a vehicle after taking this medication   Refills:  0                Prescriptions were sent or printed at these locations (1 Prescription)                   Other Prescriptions                Printed at Department/Unit printer (1 of 1)         ondansetron (ZOFRAN ODT) 4 MG ODT tab                 Procedures and tests performed during your visit     Alcohol breath test POCT    Cardiac Continuous Monitoring    EKG 12 lead    Orthostatic blood pressure and pulse      Orders Needing Specimen Collection     None      Pending Results     No orders found from 1/20/2017 to 1/22/2017.            Pending Culture Results     No orders found from 1/20/2017 to 1/22/2017.       Test Results from your hospital stay           1/21/2017  2:59 AM - Sandip Broussard RN      Component Results     Component Value Ref Range & Units Status    Alcohol Breath Test 0.089 (A) 0.00 - 0.01                 Clinical Quality Measure: Blood Pressure Screening     Your blood pressure was checked while you were in the emergency department today. The last reading we obtained was  BP: 125/76 mmHg . Please read the guidelines below about what these numbers mean and what you should do about them.  If your systolic blood pressure (the top number) is less than 120 and your diastolic blood pressure (the bottom number) is less than 80, then your blood pressure is normal. There is nothing more that you need to do about it.  If your systolic blood pressure (the top number) is 120-139 or your diastolic blood pressure (the bottom number) is 80-89, your blood pressure may be higher than it should be. You should have your blood pressure rechecked within a year by a primary care provider.  If your systolic blood pressure (the top number) is 140 or greater or your diastolic blood pressure (the bottom number) is 90 or greater, you may have high blood pressure. High blood pressure is treatable, but if left untreated over time it can put you at risk for heart attack, stroke, or kidney failure. You should have your blood pressure rechecked by a primary care provider within the next 4 weeks.  If your provider in the emergency department today gave you specific instructions to follow-up with your doctor or provider even sooner than that, you should follow that  "instruction and not wait for up to 4 weeks for your follow-up visit.        Thank you for choosing [facility]       Thank you for choosing [facility] for your care. Our goal is always to provide you with excellent care. Hearing back from our patients is one way we can continue to improve our services. Please take a few minutes to complete the written survey that you may receive in the mail after you visit with us. Thank you!        [employer] Information     mention lets you send messages to your doctor, view your test results, renew your prescriptions, schedule appointments and more. To sign up, go to [email or web address] . Click on \"Log in\" on the left side of the screen, which will take you to the Welcome page. Then click on \"Sign up Now\" on the right side of the page.     You will be asked to enter the access code listed below, as well as some personal information. Please follow the directions to create your username and password.     Your access code is: [email or web address]  Expires: 4/15/2017  4:18 AM     Your access code will  in 90 days. If you need help or a new code, please call your [facility] clinic or [phone].        Care EveryWhere ID     This is your Care EveryWhere ID. This could be used by other organizations to access your [facility] medical records  [id number]        After Visit Summary       This is your record. Keep this with you and show to your community pharmacist(s) and doctor(s) at your next visit.                  "

## 2017-01-21 NOTE — ED PROVIDER NOTES
"  History     Chief Complaint:  Medication Reaction    HPI   Kristen Hernandes is a 26 year old male who presents to the emergency department today for evaluation after drinking alcohol while taking Amoxicillin. The patient reports that he is currently taking Amoxicillin for his sinus infection and he has two days left in his antibiotic course. The patient reports that he was drinking earlier this evening, but he is adamant that it was \"not more than usual\" and he states that he is \"very consistent\" with the amount of alcohol that he consumes each night and weekend. He currently states that he feels lightheaded and that he \"went out of it and then came back in\" and that he has never felt like this before. He also states that he is nauseous but reports that he has not vomited. The patient reports that he was seen here last weekend on 01/15/2017 with a similar lightheaded feeling and he had a laboratory workup as per below. At that visit, he was told that the amoxicillin should not be causing him any sort of dizziness. The patient currently denies any abdominal pain, ear pain, chest pain, or shortness of breath. He states that his sinusitis gives him \"a decent amount of pain\" from time to time and that he had sinus surgery several years ago. The patient states that he has been eating less while he has been on his antibiotics. He also reports that he worked out on Friday evening and he may be a little dehydrated. Of note, the patient states that he was on Trazodone to help him sleep but he was recently switched to Ativan which he takes every night, however he has not yet taken Ativan this evening.     01/15/2017 - Laboratory:  Ethyl Alcohol Level: 0.14  BMP: Creatinine 0.94    Allergies:  No Known Drug Allergies    Medications:    Augment  Vyvanse  Xanax  Ativan  Cymbalta  Claritin     Past Medical History:    Anxiety  Depressive disorder  Add  Moderate episode of recurrent major depressive disorder    Past Surgical " "History:    Tonsillectomy     Family History:    No family history on file.    Social History:  Smoking Status: Never Smoker  Smokeless Tobacco: Current User  Alcohol Use: Positive  Marital Status:  Single [1]     Review of Systems   Constitutional: Positive for appetite change (Reduced while on antibiotics).   HENT: Negative for ear pain.    Respiratory: Negative for shortness of breath.    Cardiovascular: Negative for chest pain.   Gastrointestinal: Positive for nausea. Negative for vomiting and abdominal pain.   Neurological: Positive for dizziness and light-headedness.   All other systems reviewed and are negative.    Physical Exam   Vitals:  Patient Vitals for the past 24 hrs:   BP Temp Temp src Heart Rate Resp SpO2 Height Weight   01/21/17 0238 - - - 76 17 - - -   01/21/17 0132 125/76 mmHg 98  F (36.7  C) Oral 88 16 97 % 1.803 m (5' 11\") 81.647 kg (180 lb)       Physical Exam  Constitutional:  Appears well-developed and well-nourished. Cooperative.   HENT:   Head:    Atraumatic.   Mouth/Throat:   Oropharynx is without erythema or exudate. Dry oral mucosa.  Eyes:    Conjunctivae normal and EOM are normal.      Pupils are equal, round, and reactive to light.   Neck:    Normal range of motion. Neck supple.   Cardiovascular:  Mildly tachycardic, regular rhythm, normal heart sounds and radial and    dorsalis pedis pulses are 2+ and symmetric.  No murmurs, rubs, or gallops.  Pulmonary/Chest:  Effort normal and breath sounds normal.   Abdominal:   Soft. Bowel sounds are normal.      No splenomegaly or hepatomegaly. No tenderness. No rebound.   Musculoskeletal:  Normal range of motion. No edema and no tenderness.   Neurological:  Alert. Normal strength. No cranial nerve deficit. GCS 15.  Skin:    Skin is warm and dry.   Psychiatric:   Normal mood and affect.     Emergency Department Course     ECG:  ECG taken at 0205, ECG read at 0208  Normal sinus rhythm  Normal ECG  Rate 84 bpm. GA interval 148 ms. QRS duration 78 " ms. QT/QTc 372/439 ms. P-R-T axes 55 36 27.    Laboratory:  Laboratory findings were communicated with the patient who voiced understanding of the findings.    Alcohol Breath Test (Collected: 0226): 0.089 (A)    Interventions:  0200 Zofran-ODT 4 mg PO    Emergency Department Course:  Nursing notes and vitals reviewed.  I performed an exam of the patient as documented above.   I discussed the treatment plan with the patient. They expressed understanding of this plan and consented to discharge. They will be discharged home with instructions for care and follow up. In addition, the patient will return to the emergency department if their symptoms persist, worsen, if new symptoms arise or if there is any concern.  All questions were answered.  I personally reviewed the alcohol breath test results with the patient and answered all related questions prior to discharge.  Impression & Plan      Medical Decision Making:  Patient presents complaining of feeling lightheaded and dizzy after drinking alcohol tonight. He said he had a similar feeling last week when he drank alcohol. He is concerned because he is being treated for a sinus infection and has been taking Augmentin, that he is having a medication reaction. On exam, the patient appears mildly anxious and  dehydrated.    His physical exam is benign, aside from appearing mildly intoxicated and having dry oral mucosa. Orthostatic vital signs show stable blood pressures but an increased in heart rate with standing. Patient was given Zofran for nausea and some water to drink. He tolerated a snack. He remained in sinus rhythm on the monitor. I reviewed his recent records and he had a set of electrolytes when he was last seen for this problem, there is no history suggesting anemia or worsening infection. I did not think repeat lab testing was indicated. I think the patient's symptoms are due to a combination of being somewhat dehydrated and adding alcohol. He said his  appetite has been somewhat diminished while he has been taking antibiotics. There are no focal neurologic findings. He is not having vertigo. There is no nystagmus. He described a palpitations feeling but was in sinus regular rhythm while feeling this. I think there is some anxiety component to the patient's symptoms as well.    We discussed that he has been without symptoms between his two episodes of drinking alcohol. He said he went to the gym today and had a normal workout without symptoms. I advised him to avoid further alcohol use until he has fully recovered from his sinus infection. He is not currently showing any serious signs of infection including headache, facial pain, fevers, or other systemic symptoms of infection. There are no signs of an allergic reaction.   The patient was given some oral Zofran which improved his nausea. I think he is safe for discharge. I have asked him to follow up with primary care next week if he is not feeling back to normal. He will finish his course of antibiotics. He will return to the ED for worsening symptoms or other concerns. He is discharged in good condition.      Diagnosis:    ICD-10-CM    1. Alcohol use Z78.9    2. Dehydration E86.0    3. Nausea R11.0    4. Dizziness R42      Disposition:   The patient is discharged to home.     Discharge Medications:  New Prescriptions    ONDANSETRON (ZOFRAN ODT) 4 MG ODT TAB    Take 1 tablet (4 mg) by mouth every 8 hours as needed for nausea     Scribe Disclosure:  I, Silver Gray, am serving as a scribe at 9:32 PM on 12/19/2016 to document services personally performed by No att. providers found, based on my observations and the provider's statements to me.    1/21/2017    EMERGENCY DEPARTMENT        Shane Mccarthy MD  01/21/17 0736

## 2017-01-21 NOTE — DISCHARGE INSTRUCTIONS
Dehydration (Adult)  Dehydration occurs when your body loses too much fluid. This may be the result of prolonged vomiting or diarrhea, excessive sweating, or a high fever. It may also happen if you don t drink enough fluid when you re sick or out in the heat. Misuse of diuretics (water pills) can also be a cause.  Symptoms include thirst and decreased urine output. You may also feel dizzy, weak, fatigued, or very drowsy. The diet described below is usually enough to treat dehydration. In some cases, you may need medicine.  Home care    Drink at least 12 8-ounce glasses of fluid every day to resolve the dehydration. Fluid may include water; orange juice; lemonade; apple, grape, or cranberry juice; clear fruit drinks; electrolyte replacement and sports drinks; and teas and coffee without caffeine. If you have been diagnosed with a kidney disease, ask your doctor how much and what types of fluids you should drink to prevent dehydration. If you have kidney disease, fluid can build up in the body. This can be dangerous to your health.     If you have a fever, muscle aches, or a headache as a result of a cold or flu, you may take acetaminophen or ibuprofen, unless another medicine was prescribed. If you have chronic liver or kidney disease, or have ever had a stomach ulcer or gastrointestinal bleeding, talk with your health care provider before using these medicines. Don't take aspirin if you are younger than 18 and have a fever. Aspirin raises the chance for severe liver injury.  Follow-up care  Follow up with your health care provider, or as advised.  When to seek medical advice  Call your health care provider right away if any of these occur:    Continued vomiting    Frequent diarrhea (more than 5 times a day); blood (red or black color) or mucus in diarrhea    Blood in vomit or stool    Swollen abdomen or increasing abdominal pain    Weakness, dizziness, or fainting    Unusual drowsiness or confusion    Reduced  urine output or extreme thirst    Fever of 100.4 F (34 C) or higher     3887-0764 [employer]. [address] [place] [address], [place] [address]. All rights reserved. This information is not intended as a substitute for professional medical care. Always follow your healthcare professional's instructions.          Possible Causes of Dizziness or Fainting  Dizziness and fainting can have many causes. Below are some examples of possible causes your health care provider will look to rule out.    Benign Paroxysmal Positional Vertigo (BPPV)  BPPV results when calcium crystals inside the inner ear shift into the wrong position. BPPV causes episodes of vertigo (a spinning sensation). Episodes most often occur when the head is moved in a certain way.  Infection or Inflammation  The semicircular canals of the ear may become infected or inflamed. In this case, they can send the wrong balance signals. This can cause vertigo.  Meniere s Disease  Meniere s disease happens when there is too much fluid in the semicircular canals. This can cause vertigo. It also can cause hearing problems and buzzing or ringing in the ears (called tinnitus). You may also have a feeling of pressure or fullness in the ear.  Syncope  Syncope is fainting that occurs when the brain doesn t get enough oxygen-rich blood. It can be caused by low heart rate or low blood pressure. This is called vasovagal syncope. It can also be caused by sitting or standing up too quickly. This is called orthostatic hypotension. Syncope may also be due to a heart valve problem, an abnormal heart rhythm, or other heart problems. Dizziness can also occur from stroke, hemorrhage in the brain, or other problems in the brain. Your doctor may do certain tests to rule out these conditions.  Other Causes  Other causes include:    Medications. Certain medications can cause dizziness and even fainting. In some cases, stopping a medication too quickly can lead to withdrawal  symptoms, including dizziness and fainting.    Anxiety. Being anxious can lead to breathing changes, such as hyperventilation. These can lead to dizziness and fainting.  Additional causes for dizziness and fainting also exist. Talk to your doctor for more information.          8495-4550 [employer]. [address] [place] [address], [place] [address]. All rights reserved. This information is not intended as a substitute for professional medical care. Always follow your healthcare professional's instructions.

## 2017-01-21 NOTE — ED NOTES
Pt drank his usual amount tonight for a weekend and when he left the bar, noted he felt dizzy, lightheaded, and nauseated. States alcohol doesn't normally cause that for him. Was seen last weekend for same c/o. Pt did begin taking Ativan at bedtime 2 weeks ago. Also has been on amoxicillin for sinus infection with 2 days left of that. States he just doesn't feel right.

## 2017-01-25 ENCOUNTER — TELEPHONE (OUTPATIENT)
Dept: FAMILY MEDICINE | Facility: CLINIC | Age: 27
End: 2017-01-25

## 2017-02-07 ENCOUNTER — OFFICE VISIT (OUTPATIENT)
Dept: FAMILY MEDICINE | Facility: CLINIC | Age: 27
End: 2017-02-07
Payer: COMMERCIAL

## 2017-02-07 VITALS
WEIGHT: 185.6 LBS | HEART RATE: 68 BPM | SYSTOLIC BLOOD PRESSURE: 109 MMHG | TEMPERATURE: 98.4 F | HEIGHT: 71 IN | OXYGEN SATURATION: 95 % | BODY MASS INDEX: 25.98 KG/M2 | DIASTOLIC BLOOD PRESSURE: 62 MMHG

## 2017-02-07 DIAGNOSIS — F51.04 PSYCHOPHYSIOLOGICAL INSOMNIA: ICD-10-CM

## 2017-02-07 DIAGNOSIS — F98.8 ADD (ATTENTION DEFICIT DISORDER): ICD-10-CM

## 2017-02-07 DIAGNOSIS — F41.1 GAD (GENERALIZED ANXIETY DISORDER): ICD-10-CM

## 2017-02-07 DIAGNOSIS — F33.1 MODERATE EPISODE OF RECURRENT MAJOR DEPRESSIVE DISORDER (H): Primary | ICD-10-CM

## 2017-02-07 PROCEDURE — 99214 OFFICE O/P EST MOD 30 MIN: CPT | Performed by: INTERNAL MEDICINE

## 2017-02-07 RX ORDER — ALPRAZOLAM 0.5 MG
0.5 TABLET ORAL 3 TIMES DAILY PRN
Qty: 60 TABLET | Refills: 0 | Status: SHIPPED | OUTPATIENT
Start: 2017-02-07 | End: 2017-03-16

## 2017-02-07 RX ORDER — CITALOPRAM HYDROBROMIDE 20 MG/1
TABLET ORAL
Refills: 1 | COMMUNITY
Start: 2017-01-18 | End: 2017-02-07 | Stop reason: ALTCHOICE

## 2017-02-07 RX ORDER — LISDEXAMFETAMINE DIMESYLATE 30 MG/1
30 CAPSULE ORAL DAILY
Qty: 30 CAPSULE | Refills: 0 | Status: SHIPPED | OUTPATIENT
Start: 2017-02-07 | End: 2017-04-10

## 2017-02-07 RX ORDER — DULOXETIN HYDROCHLORIDE 60 MG/1
60 CAPSULE, DELAYED RELEASE ORAL DAILY
Qty: 30 CAPSULE | Refills: 1 | Status: SHIPPED | OUTPATIENT
Start: 2017-02-07 | End: 2017-04-10

## 2017-02-07 ASSESSMENT — ENCOUNTER SYMPTOMS
DEPRESSION: 1
INSOMNIA: 0
TREMORS: 0
NERVOUS/ANXIOUS: 1
PALPITATIONS: 0
HALLUCINATIONS: 0

## 2017-02-07 NOTE — PATIENT INSTRUCTIONS
Call doctor if your anxiety/depression persist/worsens, or if you develop new symptoms or side effects from the medication/s.    Follow up with Psychiatrist.

## 2017-02-07 NOTE — Clinical Note
[facility]  [address] [address]  [phone]      February 7, 2017      Kristen Hernandes  [address]   [place] [facility] [address]        To whom it may concern,    Please facilitate computer-based remote schooling for Kristen Hernandes due to attention deficit disorder, anxiety, and depression.  I am currently treating these chronic conditions which remain uncontrolled and for which I am referring him to a Psychiatrist at the [facility].      Sincerely,        Alejandro Wells MD

## 2017-02-07 NOTE — NURSING NOTE
"Chief Complaint   Patient presents with     Follow Up For     Anxiety        Initial /62 mmHg  Pulse 68  Temp(Src) 98.4  F (36.9  C) (Oral)  Ht 5' 11\" (1.803 m)  Wt 185 lb 9.6 oz (84.188 kg)  BMI 25.90 kg/m2  SpO2 95% Estimated body mass index is 25.9 kg/(m^2) as calculated from the following:    Height as of this encounter: 5' 11\" (1.803 m).    Weight as of this encounter: 185 lb 9.6 oz (84.188 kg).  Medication Reconciliation: complete     Regular Cuff (L) Arm    CAbril López MA February 7, 2017 11:10 AM      "

## 2017-02-07 NOTE — PROGRESS NOTES
"HPI Comments:   I last saw the patient at the clinic on 1/5/2017 for ROSANA, moderate depression, ADD, insomnia, and sinusitis.    I weaned the patient off citalopram (which the patient felt was ineffective) and started him on Cymbalta. He is tolerating the new medication well and has noticed slight improvement in the anxiety and depression, but feels that there is room for improvement.    I prescribed him Vyvanse for ADD.  Still has problems focusing at work but is unsure if this is secondary to his anxiety and fatigue (TSH, CBC, and BMP results were within normal limits).    For his insomnia. I prescribed short-term use of Ativan. Despite getting 8-11 hours of sleep, the patient still wakes up feeling tired.  No observation of sleep apnea or restless legs.    Sinusitis has resolved with antibiotic treatment.    Patient is needing a letter requesting for computer-based remote schooling due to his current psychiatric/psychological issues which makes it difficult for him to attend regular class-based schooling.      Past Medical History   Diagnosis Date     Anxiety      Depressive disorder      ADD (attention deficit disorder) 1/5/2017     Moderate episode of recurrent major depressive disorder (H) 1/5/2017       Review of Systems   Cardiovascular: Negative for chest pain and palpitations.   Neurological: Negative for tremors.   Psychiatric/Behavioral: Positive for depression. Negative for suicidal ideas and hallucinations. The patient is nervous/anxious. The patient does not have insomnia.        /62 mmHg  Pulse 68  Temp(Src) 98.4  F (36.9  C) (Oral)  Ht 5' 11\" (1.803 m)  Wt 185 lb 9.6 oz (84.188 kg)  BMI 25.90 kg/m2  SpO2 95%      Physical Exam   Constitutional: He is oriented to person, place, and time. No distress.   Neck: No thyromegaly present.   Cardiovascular: Normal rate, regular rhythm and normal heart sounds.    Neurological: He is alert and oriented to person, place, and time. GCS score is 15. "   No tremors   Psychiatric: Affect normal.   Depressed mood   Vitals reviewed.        ICD-10-CM    1. Moderate episode of recurrent major depressive disorder (H) F33.1 MENTAL HEALTH REFERRAL     DULoxetine (CYMBALTA) 60 MG EC capsule   2. ROSANA (generalized anxiety disorder) F41.1 MENTAL HEALTH REFERRAL     ALPRAZolam (XANAX) 0.5 MG tablet     DULoxetine (CYMBALTA) 60 MG EC capsule   3. ADD (attention deficit disorder) F98.8 MENTAL HEALTH REFERRAL     lisdexamfetamine (VYVANSE) 30 MG capsule   4. Psychophysiological insomnia F51.04 MENTAL HEALTH REFERRAL       Patient Instructions   Call doctor if your anxiety/depression persist/worsens, or if you develop new symptoms or side effects from the medication/s.    Follow up with Psychiatrist.

## 2017-02-07 NOTE — MR AVS SNAPSHOT
After Visit Summary   2/7/2017    Kristen Hernandes    MRN: [id number]           Patient Information     Date Of Birth          1990        Visit Information        Provider Department      2/7/2017 11:00 AM Alejandro Wells MD [facility]        Today's Diagnoses     Moderate episode of recurrent major depressive disorder (H)    -  1     Psychophysiological insomnia         ROSANA (generalized anxiety disorder)         ADD (attention deficit disorder)           Care Instructions    Call doctor if your anxiety/depression persist/worsens, or if you develop new symptoms or side effects from the medication/s.    Follow up with Psychiatrist.          Follow-ups after your visit        Additional Services     MENTAL HEALTH REFERRAL       Your provider has referred you to: [facility]: Psychiatry Clinic [facility] [phone]   [email or web address]    All scheduling is subject to the client's specific insurance plan & benefits, provider/location availability, and provider clinical specialities.  Please arrive 15 minutes early for your first appointment and bring your completed paperwork.    Please be aware that coverage of these services is subject to the terms and limitations of your health insurance plan.  Call member services at your health plan with any benefit or coverage questions.                  Who to contact     If you have questions or need follow up information about today's clinic visit or your schedule please contact [facility] directly at [phone].  Normal or non-critical lab and imaging results will be communicated to you by MyChart, letter or phone within 4 business days after the clinic has received the results. If you do not hear from us within 7 days, please contact the clinic through MyChart or phone. If you have a critical or abnormal lab result, we will notify you by phone as soon as possible.  Submit refill  "requests through [employer] or call your pharmacy and they will forward the refill request to us. Please allow 3 business days for your refill to be completed.          Additional Information About Your Visit        [employer] Information     [employer] lets you send messages to your doctor, view your test results, renew your prescriptions, schedule appointments and more. To sign up, go to [email or web address] . Click on \"Log in\" on the left side of the screen, which will take you to the Welcome page. Then click on \"Sign up Now\" on the right side of the page.     You will be asked to enter the access code listed below, as well as some personal information. Please follow the directions to create your username and password.     Your access code is: [email or web address]  Expires: 4/15/2017  4:18 AM     Your access code will  in 90 days. If you need help or a new code, please call your [place] clinic or [phone].        Care EveryWhere ID     This is your Care EveryWhere ID. This could be used by other organizations to access your [place] medical records  [id number]        Your Vitals Were     Pulse Temperature Height BMI (Body Mass Index) Pulse Oximetry       68 98.4  F (36.9  C) (Oral) 5' 11\" (1.803 m) 25.90 kg/m2 95%        Blood Pressure from Last 3 Encounters:   17 109/62   17 122/70   01/15/17 120/95    Weight from Last 3 Encounters:   17 185 lb 9.6 oz (84.188 kg)   17 180 lb (81.647 kg)   17 180 lb (81.647 kg)              We Performed the Following     MENTAL HEALTH REFERRAL          Today's Medication Changes          These changes are accurate as of: 17 11:28 AM.  If you have any questions, ask your nurse or doctor.               These medicines have changed or have updated prescriptions.        Dose/Directions    DULoxetine 60 MG EC capsule   Commonly known as:  CYMBALTA   This may have changed:    - medication strength  - how much to take  - how to take this  - when to take " this  - additional instructions   Used for:  ROSANA (generalized anxiety disorder), Moderate episode of recurrent major depressive disorder (H)   Changed by:  Alejanrdo Wells MD        Dose:  60 mg   Take 1 capsule (60 mg) by mouth daily   Quantity:  30 capsule   Refills:  1         Stop taking these medicines if you haven't already. Please contact your care team if you have questions.     AUGMENTIN 875-125 MG per tablet   Generic drug:  amoxicillin-clavulanate   Stopped by:  Alejandro Wells MD           citalopram 20 MG tablet   Commonly known as:  celeXA   Stopped by:  Alejandro Wells MD                Where to get your medicines      These medications were sent to [facility] [address] [place] [address] [address] [address]    Hours:  24-hours Phone:  [phone]    - DULoxetine 60 MG EC capsule      Some of these will need a paper prescription and others can be bought over the counter.  Ask your nurse if you have questions.     Bring a paper prescription for each of these medications    - ALPRAZolam 0.5 MG tablet  - lisdexamfetamine 30 MG capsule             Primary Care Provider    None , MD       No address on file        Thank you!     Thank you for choosing [facility]  for your care. Our goal is always to provide you with excellent care. Hearing back from our patients is one way we can continue to improve our services. Please take a few minutes to complete the written survey that you may receive in the mail after your visit with us. Thank you!             Your Updated Medication List - Protect others around you: Learn how to safely use, store and throw away your medicines at www.disposemymeds.org.          This list is accurate as of: 2/7/17 11:28 AM.  Always use your most recent med list.                   Brand Name Dispense Instructions for use    ALPRAZolam 0.5 MG tablet     XANAX    60 tablet    Take 1 tablet (0.5 mg) by mouth 3 times daily as needed for anxiety       DULoxetine 60 MG EC capsule    CYMBALTA    30 capsule    Take 1 capsule (60 mg) by mouth daily       lisdexamfetamine 30 MG capsule    VYVANSE    30 capsule    Take 1 capsule (30 mg) by mouth daily       loratadine 10 MG tablet    CLARITIN     Take 10 mg by mouth daily       LORazepam 1 MG tablet    ATIVAN    30 tablet    Take 0.5-1 tablets (0.5-1 mg) by mouth nightly as needed for anxiety Take 30 minutes prior to departure.  Do not operate a vehicle after taking this medication

## 2017-02-13 ENCOUNTER — TELEPHONE (OUTPATIENT)
Dept: FAMILY MEDICINE | Facility: CLINIC | Age: 27
End: 2017-02-13

## 2017-02-13 DIAGNOSIS — G47.00 INSOMNIA, UNSPECIFIED TYPE: Primary | ICD-10-CM

## 2017-02-25 ENCOUNTER — HOSPITAL ENCOUNTER (EMERGENCY)
Facility: CLINIC | Age: 27
Discharge: HOME | End: 2017-02-25
Attending: EMERGENCY MEDICINE | Admitting: EMERGENCY MEDICINE
Payer: COMMERCIAL

## 2017-02-25 VITALS
WEIGHT: 185 LBS | TEMPERATURE: 98.8 F | BODY MASS INDEX: 25.9 KG/M2 | DIASTOLIC BLOOD PRESSURE: 69 MMHG | SYSTOLIC BLOOD PRESSURE: 110 MMHG | OXYGEN SATURATION: 98 % | HEIGHT: 71 IN | RESPIRATION RATE: 16 BRPM

## 2017-02-25 DIAGNOSIS — F10.920 ALCOHOL INTOXICATION, UNCOMPLICATED (H): ICD-10-CM

## 2017-02-25 LAB
ALBUMIN SERPL-MCNC: 4.4 G/DL (ref 3.4–5)
ALP SERPL-CCNC: 78 U/L (ref 40–150)
ALT SERPL W P-5'-P-CCNC: 45 U/L (ref 0–70)
AMPHETAMINES UR QL SCN: ABNORMAL
ANION GAP SERPL CALCULATED.3IONS-SCNC: 9 MMOL/L (ref 3–14)
AST SERPL W P-5'-P-CCNC: 70 U/L (ref 0–45)
BARBITURATES UR QL: ABNORMAL
BASOPHILS # BLD AUTO: 0 10E9/L (ref 0–0.2)
BASOPHILS NFR BLD AUTO: 0.3 %
BENZODIAZ UR QL: ABNORMAL
BILIRUB SERPL-MCNC: 0.2 MG/DL (ref 0.2–1.3)
BUN SERPL-MCNC: 10 MG/DL (ref 7–30)
CALCIUM SERPL-MCNC: 8.6 MG/DL (ref 8.5–10.1)
CANNABINOIDS UR QL SCN: ABNORMAL
CHLORIDE SERPL-SCNC: 105 MMOL/L (ref 94–109)
CO2 SERPL-SCNC: 27 MMOL/L (ref 20–32)
COCAINE UR QL: ABNORMAL
CREAT SERPL-MCNC: 0.88 MG/DL (ref 0.66–1.25)
DIFFERENTIAL METHOD BLD: NORMAL
EOSINOPHIL # BLD AUTO: 0.2 10E9/L (ref 0–0.7)
EOSINOPHIL NFR BLD AUTO: 2.2 %
ERYTHROCYTE [DISTWIDTH] IN BLOOD BY AUTOMATED COUNT: 12.1 % (ref 10–15)
ETHANOL SERPL-MCNC: 0.12 G/DL
GFR SERPL CREATININE-BSD FRML MDRD: ABNORMAL ML/MIN/1.7M2
GLUCOSE SERPL-MCNC: 91 MG/DL (ref 70–99)
HCT VFR BLD AUTO: 42.3 % (ref 40–53)
HGB BLD-MCNC: 14.8 G/DL (ref 13.3–17.7)
IMM GRANULOCYTES # BLD: 0 10E9/L (ref 0–0.4)
IMM GRANULOCYTES NFR BLD: 0.1 %
LYMPHOCYTES # BLD AUTO: 2 10E9/L (ref 0.8–5.3)
LYMPHOCYTES NFR BLD AUTO: 25.5 %
MCH RBC QN AUTO: 32.4 PG (ref 26.5–33)
MCHC RBC AUTO-ENTMCNC: 35 G/DL (ref 31.5–36.5)
MCV RBC AUTO: 93 FL (ref 78–100)
MONOCYTES # BLD AUTO: 0.4 10E9/L (ref 0–1.3)
MONOCYTES NFR BLD AUTO: 5.6 %
NEUTROPHILS # BLD AUTO: 5.2 10E9/L (ref 1.6–8.3)
NEUTROPHILS NFR BLD AUTO: 66.3 %
NRBC # BLD AUTO: 0 10*3/UL
NRBC BLD AUTO-RTO: 0 /100
OPIATES UR QL SCN: ABNORMAL
PCP UR QL SCN: ABNORMAL
PLATELET # BLD AUTO: 284 10E9/L (ref 150–450)
POTASSIUM SERPL-SCNC: 4 MMOL/L (ref 3.4–5.3)
PROT SERPL-MCNC: 7.4 G/DL (ref 6.8–8.8)
RBC # BLD AUTO: 4.57 10E12/L (ref 4.4–5.9)
SODIUM SERPL-SCNC: 141 MMOL/L (ref 133–144)
WBC # BLD AUTO: 7.9 10E9/L (ref 4–11)

## 2017-02-25 PROCEDURE — 80053 COMPREHEN METABOLIC PANEL: CPT | Performed by: EMERGENCY MEDICINE

## 2017-02-25 PROCEDURE — 85025 COMPLETE CBC W/AUTO DIFF WBC: CPT | Performed by: EMERGENCY MEDICINE

## 2017-02-25 PROCEDURE — 80320 DRUG SCREEN QUANTALCOHOLS: CPT | Performed by: EMERGENCY MEDICINE

## 2017-02-25 PROCEDURE — 99283 EMERGENCY DEPT VISIT LOW MDM: CPT | Mod: 25

## 2017-02-25 PROCEDURE — 25000128 H RX IP 250 OP 636: Performed by: EMERGENCY MEDICINE

## 2017-02-25 PROCEDURE — 96360 HYDRATION IV INFUSION INIT: CPT

## 2017-02-25 PROCEDURE — 25000125 ZZHC RX 250: Performed by: EMERGENCY MEDICINE

## 2017-02-25 PROCEDURE — 80307 DRUG TEST PRSMV CHEM ANLYZR: CPT | Performed by: EMERGENCY MEDICINE

## 2017-02-25 RX ORDER — ONDANSETRON 4 MG/1
4 TABLET, ORALLY DISINTEGRATING ORAL ONCE
Status: COMPLETED | OUTPATIENT
Start: 2017-02-25 | End: 2017-02-25

## 2017-02-25 RX ORDER — SODIUM CHLORIDE 9 MG/ML
1000 INJECTION, SOLUTION INTRAVENOUS CONTINUOUS
Status: DISCONTINUED | OUTPATIENT
Start: 2017-02-25 | End: 2017-02-25 | Stop reason: HOSPADM

## 2017-02-25 RX ADMIN — ONDANSETRON 4 MG: 4 TABLET, ORALLY DISINTEGRATING ORAL at 02:26

## 2017-02-25 RX ADMIN — SODIUM CHLORIDE 1000 ML: 9 INJECTION, SOLUTION INTRAVENOUS at 04:02

## 2017-02-25 NOTE — ED AVS SNAPSHOT
Emergency Department    [address] [facility] [address]    Phone:  [phone]    Fax:  [phone]                                       Kristen Hernandes   MRN: [id number]    Department:   Emergency Department   Date of Visit:  2/25/2017           After Visit Summary Signature Page     I have received my discharge instructions, and my questions have been answered. I have discussed any challenges I see with this plan with the nurse or doctor.    ..........................................................................................................................................  Patient/Patient Representative Signature      ..........................................................................................................................................  Patient Representative Print Name and Relationship to Patient    ..................................................               ................................................  Date                                            Time    ..........................................................................................................................................  Reviewed by Signature/Title    ...................................................              ..............................................  Date                                                            Time

## 2017-02-25 NOTE — DISCHARGE INSTRUCTIONS
Discharge Instructions  Alcohol Intoxication    You have been seen today with alcohol intoxication. This means that you have enough alcohol in your system to impair your ability to mentally and physically function. When you are intoxicated, we are not allowed to release you without a sober adult to be with you. You may not drive, operate dangerous equipment, or do anything else dangerous until you are sober.    You may have come to the Emergency Department because of your intoxication, or for another reason, such as because of an injury. No matter what the case is, this visit is a  red flag  regarding alcohol use, and you should consider whether your drinking pattern is a problem for you.     You may be at risk for alcohol-related problems if:      Men: you drink more than 14 drinks per week, or more than 4 drinks per occasion.      Women: you drink more than 7 drinks per week or more than 3 drinks per occasion.      You have black-outs.    You do things you regret while drinking.    You have legal problems because of drinking (DUI).    You have job problems because of drinking (you call in sick to work because of drinking).    CAGE Questions    Have you ever felt you should cut down on your drinking?    Have people annoyed you by criticizing your drinking?    Have you ever felt bad or guilty about your drinking?    Have you ever had a drink first thing in the morning to steady your nerves or get rid of a hangover (eye opener)?    If you answer yes to any of the CAGE questions, you may have a problem with alcohol.      Return to the Emergency Department if:    You become shaky or tremble when you try to stop drinking.      You have a seizure or pass out.      You throw up (vomit) blood. This may be bright red or it may look like black coffee grounds.      You have blood in the stool. This may be bright red or appear as a black, tarry, bad smelling stool.      You become lightheaded or faint.      For further help,  contact:     Your caregiver.      Alcoholics Anonymous (AA).      A drug or alcohol rehabilitation program.      You can get information on alcohol resources and groups by calling the number 211 or 1-335.221.6058 on any phone.       Seek medical care if:    You have persistent vomiting.      You have persistent pain in any part of your body.      You do not feel better after a few days.    If you were given a prescription for medicine here today, be sure to read all of the information (including the package insert) that comes with your prescription.  This will include important information about the medicine, its side effects, and any warnings that you need to know about.  The pharmacist who fills the prescription can provide more information and answer questions you may have about the medicine.  If you have questions or concerns that the pharmacist cannot address, please call or return to the Emergency Department.   Remember that you can always come back to the Emergency Department if you are not able to see your regular doctor in the amount of time listed above, if you get any new symptoms, or if there is anything that worries you.

## 2017-02-25 NOTE — ED AVS SNAPSHOT
Emergency Department    [address] [facility] [address]    Phone:  [phone]    Fax:  [phone]                                       Kristen Hernandes   MRN: [id number]    Department:   Emergency Department   Date of Visit:  2/25/2017           Patient Information     Date Of Birth          1990        Your diagnoses for this visit were:     Alcohol intoxication, uncomplicated (H)        You were seen by Rashad Fisher MD.      Follow-up Information     Follow up with Alejandro Wells MD In 1 week.    Specialty:  Internal Medicine    Contact information:    [facility]  [address]  [facility] [address]  [phone]          Follow up with  Emergency Department.    Specialty:  EMERGENCY MEDICINE    Why:  As needed, If symptoms worsen    Contact information:    [address] [facility] [id number] [phone]        Discharge Instructions       Discharge Instructions  Alcohol Intoxication    You have been seen today with alcohol intoxication. This means that you have enough alcohol in your system to impair your ability to mentally and physically function. When you are intoxicated, we are not allowed to release you without a sober adult to be with you. You may not drive, operate dangerous equipment, or do anything else dangerous until you are sober.    You may have come to the Emergency Department because of your intoxication, or for another reason, such as because of an injury. No matter what the case is, this visit is a  red flag  regarding alcohol use, and you should consider whether your drinking pattern is a problem for you.     You may be at risk for alcohol-related problems if:      Men: you drink more than 14 drinks per week, or more than 4 drinks per occasion.      Women: you drink more than 7 drinks per week or more than 3 drinks per occasion.      You have black-outs.    You do things you regret while  drinking.    You have legal problems because of drinking (DUI).    You have job problems because of drinking (you call in sick to work because of drinking).    CAGE Questions    Have you ever felt you should cut down on your drinking?    Have people annoyed you by criticizing your drinking?    Have you ever felt bad or guilty about your drinking?    Have you ever had a drink first thing in the morning to steady your nerves or get rid of a hangover (eye opener)?    If you answer yes to any of the CAGE questions, you may have a problem with alcohol.      Return to the Emergency Department if:    You become shaky or tremble when you try to stop drinking.      You have a seizure or pass out.      You throw up (vomit) blood. This may be bright red or it may look like black coffee grounds.      You have blood in the stool. This may be bright red or appear as a black, tarry, bad smelling stool.      You become lightheaded or faint.      For further help, contact:     Your caregiver.      Alcoholics Anonymous (AA).      A drug or alcohol rehabilitation program.      You can get information on alcohol resources and groups by calling the number 480 or 1-400.845.4229 on any phone.       Seek medical care if:    You have persistent vomiting.      You have persistent pain in any part of your body.      You do not feel better after a few days.    If you were given a prescription for medicine here today, be sure to read all of the information (including the package insert) that comes with your prescription.  This will include important information about the medicine, its side effects, and any warnings that you need to know about.  The pharmacist who fills the prescription can provide more information and answer questions you may have about the medicine.  If you have questions or concerns that the pharmacist cannot address, please call or return to the Emergency Department.   Remember that you can always come back to the Emergency  Department if you are not able to see your regular doctor in the amount of time listed above, if you get any new symptoms, or if there is anything that worries you.        24 Hour Appointment Hotline       To make an appointment at any [facility], call [phone] ([phone]). If you don't have a family doctor or clinic, we will help you find one. [place] clinics are conveniently located to serve the needs of you and your family.             Review of your medicines      Our records show that you are taking the medicines listed below. If these are incorrect, please call your family doctor or clinic.        Dose / Directions Last dose taken    ALPRAZolam 0.5 MG tablet   Commonly known as:  XANAX   Dose:  0.5 mg   Quantity:  60 tablet        Take 1 tablet (0.5 mg) by mouth 3 times daily as needed for anxiety   Refills:  0        DULoxetine 60 MG EC capsule   Commonly known as:  CYMBALTA   Dose:  60 mg   Quantity:  30 capsule        Take 1 capsule (60 mg) by mouth daily   Refills:  1        lisdexamfetamine 30 MG capsule   Commonly known as:  VYVANSE   Dose:  30 mg   Quantity:  30 capsule        Take 1 capsule (30 mg) by mouth daily   Refills:  0        loratadine 10 MG tablet   Commonly known as:  CLARITIN   Dose:  10 mg        Take 10 mg by mouth daily   Refills:  0        LORazepam 1 MG tablet   Commonly known as:  ATIVAN   Dose:  0.5-1 mg   Quantity:  30 tablet        Take 0.5-1 tablets (0.5-1 mg) by mouth nightly as needed for anxiety Take 30 minutes prior to departure.  Do not operate a vehicle after taking this medication   Refills:  0                Procedures and tests performed during your visit     CBC with platelets differential    Comprehensive metabolic panel    Drug abuse screen 77 urine ([place],RH,SH)    Ethanol level    Peripheral IV: Standard      Orders Needing Specimen Collection     None      Pending Results     No orders found from 2/23/2017 to 2/26/2017.            Pending Culture  Results     No orders found from 2/23/2017 to 2/26/2017.             Test Results from your hospital stay     2/25/2017  4:09 AM - Interface, Flexilab Results      Component Results     Component Value Ref Range & Units Status    WBC 7.9 4.0 - 11.0 10e9/L Final    RBC Count 4.57 4.4 - 5.9 10e12/L Final    Hemoglobin 14.8 13.3 - 17.7 g/dL Final    Hematocrit 42.3 40.0 - 53.0 % Final    MCV 93 78 - 100 fl Final    MCH 32.4 26.5 - 33.0 pg Final    MCHC 35.0 31.5 - 36.5 g/dL Final    RDW 12.1 10.0 - 15.0 % Final    Platelet Count 284 150 - 450 10e9/L Final    Diff Method Automated Method  Final    % Neutrophils 66.3 % Final    % Lymphocytes 25.5 % Final    % Monocytes 5.6 % Final    % Eosinophils 2.2 % Final    % Basophils 0.3 % Final    % Immature Granulocytes 0.1 % Final    Nucleated RBCs 0 0 /100 Final    Absolute Neutrophil 5.2 1.6 - 8.3 10e9/L Final    Absolute Lymphocytes 2.0 0.8 - 5.3 10e9/L Final    Absolute Monocytes 0.4 0.0 - 1.3 10e9/L Final    Absolute Eosinophils 0.2 0.0 - 0.7 10e9/L Final    Absolute Basophils 0.0 0.0 - 0.2 10e9/L Final    Abs Immature Granulocytes 0.0 0 - 0.4 10e9/L Final    Absolute Nucleated RBC 0.0  Final         2/25/2017  4:25 AM - Interface, Flexilab Results      Component Results     Component Value Ref Range & Units Status    Sodium 141 133 - 144 mmol/L Final    Potassium 4.0 3.4 - 5.3 mmol/L Final    Chloride 105 94 - 109 mmol/L Final    Carbon Dioxide 27 20 - 32 mmol/L Final    Anion Gap 9 3 - 14 mmol/L Final    Glucose 91 70 - 99 mg/dL Final    Urea Nitrogen 10 7 - 30 mg/dL Final    Creatinine 0.88 0.66 - 1.25 mg/dL Final    GFR Estimate >90  Non  GFR Calc   >60 mL/min/1.7m2 Final    GFR Estimate If Black >90   GFR Calc   >60 mL/min/1.7m2 Final    Calcium 8.6 8.5 - 10.1 mg/dL Final    Bilirubin Total 0.2 0.2 - 1.3 mg/dL Final    Albumin 4.4 3.4 - 5.0 g/dL Final    Protein Total 7.4 6.8 - 8.8 g/dL Final    Alkaline Phosphatase 78 40 - 150 U/L  Final    ALT 45 0 - 70 U/L Final    AST 70 (H) 0 - 45 U/L Final         2/25/2017  4:23 AM - Interface, Flexilab Results      Component Results     Component Value Ref Range & Units Status    Ethanol g/dL 0.12 (H) <0.01 g/dL Final         2/25/2017  4:18 AM - Interface, Flexilab Results      Component Results     Component Value Ref Range & Units Status    Amphetamine Qual Urine  NEG Final    Positive   Cutoff for a positive amphetamine is greater than 500 ng/mL. This is an   unconfirmed screening result to be used for medical purposes only.   (A)    Barbiturates Qual Urine  NEG Final    Negative   Cutoff for a negative barbiturate is 200 ng/mL or less.      Benzodiazepine Qual Urine  NEG Final    Negative   Cutoff for a negative benzodiazepine is 200 ng/mL or less.      Cannabinoids Qual Urine  NEG Final    Negative   Cutoff for a negative cannabinoid is 50 ng/mL or less.      Cocaine Qual Urine  NEG Final    Negative   Cutoff for a negative cocaine is 300 ng/mL or less.      Opiates Qualitative Urine  NEG Final    Negative   Cutoff for a negative opiate is 300 ng/mL or less.      PCP Qual Urine  NEG Final    Negative   Cutoff for a negative PCP is 25 ng/mL or less.                  Clinical Quality Measure: Blood Pressure Screening     Your blood pressure was checked while you were in the emergency department today. The last reading we obtained was  BP: 110/69 . Please read the guidelines below about what these numbers mean and what you should do about them.  If your systolic blood pressure (the top number) is less than 120 and your diastolic blood pressure (the bottom number) is less than 80, then your blood pressure is normal. There is nothing more that you need to do about it.  If your systolic blood pressure (the top number) is 120-139 or your diastolic blood pressure (the bottom number) is 80-89, your blood pressure may be higher than it should be. You should have your blood pressure rechecked within a year  "by a primary care provider.  If your systolic blood pressure (the top number) is 140 or greater or your diastolic blood pressure (the bottom number) is 90 or greater, you may have high blood pressure. High blood pressure is treatable, but if left untreated over time it can put you at risk for heart attack, stroke, or kidney failure. You should have your blood pressure rechecked by a primary care provider within the next 4 weeks.  If your provider in the emergency department today gave you specific instructions to follow-up with your doctor or provider even sooner than that, you should follow that instruction and not wait for up to 4 weeks for your follow-up visit.        Thank you for choosing [facility]       Thank you for choosing [facility] for your care. Our goal is always to provide you with excellent care. Hearing back from our patients is one way we can continue to improve our services. Please take a few minutes to complete the written survey that you may receive in the mail after you visit with us. Thank you!        [facility] Information     [employer] lets you send messages to your doctor, view your test results, renew your prescriptions, schedule appointments and more. To sign up, go to [email or web address] . Click on \"Log in\" on the left side of the screen, which will take you to the Welcome page. Then click on \"Sign up Now\" on the right side of the page.     You will be asked to enter the access code listed below, as well as some personal information. Please follow the directions to create your username and password.     Your access code is: [email or web address]  Expires: 4/15/2017  4:18 AM     Your access code will  in 90 days. If you need help or a new code, please call your [facility] clinic or [phone].        Care EveryWhere ID     This is your Care EveryWhere ID. This could be used by other organizations to access your [facility] medical records  [id number]        After Visit Summary       This is your " record. Keep this with you and show to your community pharmacist(s) and doctor(s) at your next visit.
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
## 2017-03-05 ENCOUNTER — HOSPITAL ENCOUNTER (EMERGENCY)
Facility: CLINIC | Age: 27
Discharge: HOME | End: 2017-03-05
Attending: EMERGENCY MEDICINE | Admitting: EMERGENCY MEDICINE
Payer: COMMERCIAL

## 2017-03-05 VITALS
HEART RATE: 99 BPM | SYSTOLIC BLOOD PRESSURE: 145 MMHG | HEIGHT: 71 IN | RESPIRATION RATE: 18 BRPM | DIASTOLIC BLOOD PRESSURE: 70 MMHG | OXYGEN SATURATION: 97 % | TEMPERATURE: 98.1 F

## 2017-03-05 DIAGNOSIS — F10.920 ALCOHOL INTOXICATION, UNCOMPLICATED (H): ICD-10-CM

## 2017-03-05 LAB — INTERPRETATION ECG - MUSE: NORMAL

## 2017-03-05 PROCEDURE — 99283 EMERGENCY DEPT VISIT LOW MDM: CPT

## 2017-03-05 PROCEDURE — 93005 ELECTROCARDIOGRAM TRACING: CPT

## 2017-03-05 ASSESSMENT — ENCOUNTER SYMPTOMS
NAUSEA: 1
DIZZINESS: 1
ABDOMINAL PAIN: 0
VOMITING: 0
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
## 2017-03-05 NOTE — ED AVS SNAPSHOT
Emergency Department    [address] [facility] [address]    Phone:  [phone]    Fax:  [phone]                                       Kristen Hernandes   MRN: [id number]    Department:   Emergency Department   Date of Visit:  3/5/2017           After Visit Summary Signature Page     I have received my discharge instructions, and my questions have been answered. I have discussed any challenges I see with this plan with the nurse or doctor.    ..........................................................................................................................................  Patient/Patient Representative Signature      ..........................................................................................................................................  Patient Representative Print Name and Relationship to Patient    ..................................................               ................................................  Date                                            Time    ..........................................................................................................................................  Reviewed by Signature/Title    ...................................................              ..............................................  Date                                                            Time

## 2017-03-05 NOTE — DISCHARGE INSTRUCTIONS
"  Alcohol Intoxication  Alcohol intoxication occurs when you drink alcohol faster than your liver can remove it from your system. The following facts are important to remember:    It can take 10 minutes or more to start to feel the effects of a drink, so you can easily get more intoxicated than you intended.    One drink may be more than 1 serving of alcohol. Depending on the drink, it can be 2 to 4 servings.    It takes about an hour for your body to metabolize (clear) 1 serving. If you have more than 1 drink, it can take a couple of hours or more.    Many things affect how drinks will affect you, including whether you ve eaten, how fast you drink, your size, how much you normally drink (or not), medications you take, chronic diseases you have, and gender.  Signs and symptoms of alcohol poisoning  The following are signs and symptoms of alcohol poisoning:  Mild impairment    Reduced inhibitions    Slurred speech    Drowsiness    Decreased fine motor skills  Moderate impairment    Erratic behavior, aggression, depression    Impaired judgment    Confusion    Concentration difficulties    Coordination problems  Severe impairment    Vomiting    Seizures    Unconsciousness    Cold, clammy    Slow or irregular breathing    Hypothermia (low body temperature)    Coma  Health effects  Alcohol abuse causes health problems. Sometimes this can happen after only drinking a  little.\" There is no set number of drinks or amount of alcohol that defines too much. The more you drink at one time, and the more frequently you drink determine both the short-term and long-term health effects. It affects all parts of your body and your health, including your:    Brain. Alcohol is a central nervous system depressant. It can damage parts of the brain that affect your balance, memory, thinking, and emotions. It can cause memory loss, blackouts, depression, agitation, sleep cycle changes, and seizures. These changes may or may not be " reversible.    Heart and vascular system. Alcohol affects multiple areas. It can damage heart muscle causing cardiomyopathy, which is a weakening and stretching of the heart muscle. This can lead to trouble breathing, an irregular heartbeat, atrial fibrillation, leg swelling, and heart failure. It makes the blood vessels stiffen causing hypertension (high blood pressure). All of these problems increase your risk of having heart attacks or strokes.    Liver. Alcohol causes fat to build up in the liver, affecting its normal function. This increases the risk for hepatitis, leading to abdominal pain, appetite loss, jaundice, bleeding problems, liver fibrosis, and cirrhosis. This in turn can affect your ability to fight off infections, and can cause diabetes. The liver changes prevent it from removing toxins in your blood that can cause encephalopathy. Signs of this are confusion, altered level of consciousness, personality changes, memory loss, seizures, coma, and death.    Pancreas. Alcohol can cause inflammation of the pancreas, or pancreatitis. This can cause pain in your abdomen, fever, and diabetes.    Immune system. Alcohol weakens your immune system in a number of ways. It suppresses your immune system making it harder to fight off infections and colds. You will also have a higher risk of certain infections like pneumonia and tuberculosis.    Cancer risk. Alcohol raises your risk of cancer of the mouth, esophagus, pharynx, larynx, liver, and breast.    Sexual function. Alcohol abuse can also lead to sexual problems.  Alcohol use during pregnancy may cause permanent damage to the growing baby.  Home care  The following guidelines will help you care for yourself at home:    Don't drink any more alcohol.    Don't drive until all effects of the alcohol have worn off.    Don't operate machinery that can cause injuries.    Get lots of rest over the next few days. Drink plenty of water and other non-alcoholic liquids.  Try to eat regular meals.    If you have been drinking heavily on a daily basis, you may go through alcohol withdrawal. The usual symptoms last 3 to 4 days and may include nervousness, shakiness, nausea, sweating, sleeplessness, and can even cause seizures and a serious withdrawal symptom called delirium tremens, or DTs. During this time, it is best that you stay with family or friends who can help and support you. You can also admit yourself to a residential detox program. If your symptoms are severe (seizures, severe shakiness, confusion), contact your doctor or call an ambulance for help (see below).   Follow-up care  If alcohol is a problem in your life, these are some organizations that can help you:    Alcoholics Anonymous offers support through a self-help fellowship. There are no dues or fees. See the Yellow Pages and call for time and place of meetings. Find AA online at www.aa.org.    [employer] offers support to families of alcohol users. Contact 741-433-8172, or online at www.al-anon.org.    [employer] [place] on Alcoholism and Drug Dependence can be reached at 903-024-1752, or online at www.ncadd.org.    There are also inpatient and residential alcohol detox programs. Check the Internet or phonebook Yellow Pages under  D[facility] Abuse [facility].   Call 911  Call 911 if any of these occur:    Trouble breathing or slow irregular breathing    Chest pain    Sudden weakness on one side of your body or sudden trouble speaking    Heavy bleeding or vomiting blood    Very drowsy or trouble awakening    Fainting or loss of consciousness    Rapid heart rate    Seizure  When to seek medical care  Get prompt medical attention if any of the following occur:    Severe shakiness     Fever over 100.4  F (38.0  C)    Confusion or hallucinations (seeing, hearing, or feeling things that are not there)    Pain in your upper abdomen that gets worse    Repeated vomiting    9477-3395 [employer]. [address] [place] [address]  [place] [address]. All rights reserved. This information is not intended as a substitute for professional medical care. Always follow your healthcare professional's instructions.

## 2017-03-05 NOTE — ED AVS SNAPSHOT
Emergency Department    [address] [facility] [address]    Phone:  [phone]    Fax:  [phone]                                       Kristen Hernandes   MRN: [id number]    Department:   Emergency Department   Date of Visit:  3/5/2017           Patient Information     Date Of Birth          1990        Your diagnoses for this visit were:     Alcohol intoxication, uncomplicated (H)        You were seen by Domingo Coffman DO.      Follow-up Information     Follow up with Alejandro Wells MD In 2 days.    Specialty:  Internal Medicine    Contact information:    [facility]  [address] [facility] [address]  [facility] [address] [phone]          Follow up with  Emergency Department.    Specialty:  EMERGENCY MEDICINE    Why:  If symptoms worsen    Contact information:    [address] [facility] [id number] [phone]        Discharge Instructions         Alcohol Intoxication  Alcohol intoxication occurs when you drink alcohol faster than your liver can remove it from your system. The following facts are important to remember:    It can take 10 minutes or more to start to feel the effects of a drink, so you can easily get more intoxicated than you intended.    One drink may be more than 1 serving of alcohol. Depending on the drink, it can be 2 to 4 servings.    It takes about an hour for your body to metabolize (clear) 1 serving. If you have more than 1 drink, it can take a couple of hours or more.    Many things affect how drinks will affect you, including whether you ve eaten, how fast you drink, your size, how much you normally drink (or not), medications you take, chronic diseases you have, and gender.  Signs and symptoms of alcohol poisoning  The following are signs and symptoms of alcohol poisoning:  Mild impairment    Reduced inhibitions    Slurred speech    Drowsiness    Decreased fine motor skills  Moderate impairment    Erratic  "behavior, aggression, depression    Impaired judgment    Confusion    Concentration difficulties    Coordination problems  Severe impairment    Vomiting    Seizures    Unconsciousness    Cold, clammy    Slow or irregular breathing    Hypothermia (low body temperature)    Coma  Health effects  Alcohol abuse causes health problems. Sometimes this can happen after only drinking a  little.\" There is no set number of drinks or amount of alcohol that defines too much. The more you drink at one time, and the more frequently you drink determine both the short-term and long-term health effects. It affects all parts of your body and your health, including your:    Brain. Alcohol is a central nervous system depressant. It can damage parts of the brain that affect your balance, memory, thinking, and emotions. It can cause memory loss, blackouts, depression, agitation, sleep cycle changes, and seizures. These changes may or may not be reversible.    Heart and vascular system. Alcohol affects multiple areas. It can damage heart muscle causing cardiomyopathy, which is a weakening and stretching of the heart muscle. This can lead to trouble breathing, an irregular heartbeat, atrial fibrillation, leg swelling, and heart failure. It makes the blood vessels stiffen causing hypertension (high blood pressure). All of these problems increase your risk of having heart attacks or strokes.    Liver. Alcohol causes fat to build up in the liver, affecting its normal function. This increases the risk for hepatitis, leading to abdominal pain, appetite loss, jaundice, bleeding problems, liver fibrosis, and cirrhosis. This in turn can affect your ability to fight off infections, and can cause diabetes. The liver changes prevent it from removing toxins in your blood that can cause encephalopathy. Signs of this are confusion, altered level of consciousness, personality changes, memory loss, seizures, coma, and death.    Pancreas. Alcohol can cause " inflammation of the pancreas, or pancreatitis. This can cause pain in your abdomen, fever, and diabetes.    Immune system. Alcohol weakens your immune system in a number of ways. It suppresses your immune system making it harder to fight off infections and colds. You will also have a higher risk of certain infections like pneumonia and tuberculosis.    Cancer risk. Alcohol raises your risk of cancer of the mouth, esophagus, pharynx, larynx, liver, and breast.    Sexual function. Alcohol abuse can also lead to sexual problems.  Alcohol use during pregnancy may cause permanent damage to the growing baby.  Home care  The following guidelines will help you care for yourself at home:    Don't drink any more alcohol.    Don't drive until all effects of the alcohol have worn off.    Don't operate machinery that can cause injuries.    Get lots of rest over the next few days. Drink plenty of water and other non-alcoholic liquids. Try to eat regular meals.    If you have been drinking heavily on a daily basis, you may go through alcohol withdrawal. The usual symptoms last 3 to 4 days and may include nervousness, shakiness, nausea, sweating, sleeplessness, and can even cause seizures and a serious withdrawal symptom called delirium tremens, or DTs. During this time, it is best that you stay with family or friends who can help and support you. You can also admit yourself to a residential detox program. If your symptoms are severe (seizures, severe shakiness, confusion), contact your doctor or call an ambulance for help (see below).   Follow-up care  If alcohol is a problem in your life, these are some organizations that can help you:    Alcoholics Anonymous offers support through a self-help fellowship. There are no dues or fees. See the Yellow Pages and call for time and place of meetings. Find AA online at www.aa.org.    [employer] offers support to families of alcohol users. Contact 852-768-5707, or online  at www.al-anon.org.    National [place] on Alcoholism and Drug Dependence can be reached at 625-491-0612, or online at www.ncadd.org.    There are also inpatient and residential alcohol detox programs. Check the Internet or phonebook Yellow Pages under  Drug Abuse and Treatment Centers.   Call 911  Call 911 if any of these occur:    Trouble breathing or slow irregular breathing    Chest pain    Sudden weakness on one side of your body or sudden trouble speaking    Heavy bleeding or vomiting blood    Very drowsy or trouble awakening    Fainting or loss of consciousness    Rapid heart rate    Seizure  When to seek medical care  Get prompt medical attention if any of the following occur:    Severe shakiness     Fever over 100.4  F (38.0  C)    Confusion or hallucinations (seeing, hearing, or feeling things that are not there)    Pain in your upper abdomen that gets worse    Repeated vomiting    0491-0774 [employer]. [address] [place] [address]. All rights reserved. This information is not intended as a substitute for professional medical care. Always follow your healthcare professional's instructions.          24 Hour Appointment Hotline       To make an appointment at any [facility], call [phone] ([phone]). If you don't have a family doctor or clinic, we will help you find one. [facility] are conveniently located to serve the needs of you and your family.             Review of your medicines      Our records show that you are taking the medicines listed below. If these are incorrect, please call your family doctor or clinic.        Dose / Directions Last dose taken    ALPRAZolam 0.5 MG tablet   Commonly known as:  XANAX   Dose:  0.5 mg   Quantity:  60 tablet        Take 1 tablet (0.5 mg) by mouth 3 times daily as needed for anxiety   Refills:  0        DULoxetine 60 MG EC capsule   Commonly known as:  CYMBALTA   Dose:  60 mg   Quantity:  30 capsule        Take 1  capsule (60 mg) by mouth daily   Refills:  1        lisdexamfetamine 30 MG capsule   Commonly known as:  VYVANSE   Dose:  30 mg   Quantity:  30 capsule        Take 1 capsule (30 mg) by mouth daily   Refills:  0        loratadine 10 MG tablet   Commonly known as:  CLARITIN   Dose:  10 mg        Take 10 mg by mouth daily   Refills:  0        LORazepam 1 MG tablet   Commonly known as:  ATIVAN   Dose:  0.5-1 mg   Quantity:  30 tablet        Take 0.5-1 tablets (0.5-1 mg) by mouth nightly as needed for anxiety Take 30 minutes prior to departure.  Do not operate a vehicle after taking this medication   Refills:  0        MUCINEX PO        Refills:  0        SUDAFED PO        Refills:  0                Procedures and tests performed during your visit     EKG 12-lead, tracing only      Orders Needing Specimen Collection     None      Pending Results     Date and Time Order Name Status Description    3/5/2017 0311 EKG 12-lead, tracing only Preliminary             Pending Culture Results     No orders found from 3/3/2017 to 3/6/2017.             Test Results from your hospital stay            Clinical Quality Measure: Blood Pressure Screening     Your blood pressure was checked while you were in the emergency department today. The last reading we obtained was  BP: 145/70 . Please read the guidelines below about what these numbers mean and what you should do about them.  If your systolic blood pressure (the top number) is less than 120 and your diastolic blood pressure (the bottom number) is less than 80, then your blood pressure is normal. There is nothing more that you need to do about it.  If your systolic blood pressure (the top number) is 120-139 or your diastolic blood pressure (the bottom number) is 80-89, your blood pressure may be higher than it should be. You should have your blood pressure rechecked within a year by a primary care provider.  If your systolic blood pressure (the top number) is 140 or greater or your  "diastolic blood pressure (the bottom number) is 90 or greater, you may have high blood pressure. High blood pressure is treatable, but if left untreated over time it can put you at risk for heart attack, stroke, or kidney failure. You should have your blood pressure rechecked by a primary care provider within the next 4 weeks.  If your provider in the emergency department today gave you specific instructions to follow-up with your doctor or provider even sooner than that, you should follow that instruction and not wait for up to 4 weeks for your follow-up visit.        Thank you for choosing [facility]       Thank you for choosing [facility] for your care. Our goal is always to provide you with excellent care. Hearing back from our patients is one way we can continue to improve our services. Please take a few minutes to complete the written survey that you may receive in the mail after you visit with us. Thank you!        [employer] Information     [employer] lets you send messages to your doctor, view your test results, renew your prescriptions, schedule appointments and more. To sign up, go to [email or web address] . Click on \"Log in\" on the left side of the screen, which will take you to the Welcome page. Then click on \"Sign up Now\" on the right side of the page.     You will be asked to enter the access code listed below, as well as some personal information. Please follow the directions to create your username and password.     Your access code is: [email or web address]  Expires: 4/15/2017  4:18 AM     Your access code will  in 90 days. If you need help or a new code, please call your [facility] clinic or [phone].        Care EveryWhere ID     This is your Care EveryWhere ID. This could be used by other organizations to access your [facility] medical records  [id number]        After Visit Summary       This is your record. Keep this with you and show to your community pharmacist(s) and doctor(s) at your next visit.  "

## 2017-03-16 ENCOUNTER — OFFICE VISIT (OUTPATIENT)
Dept: SLEEP MEDICINE | Facility: CLINIC | Age: 27
End: 2017-03-16
Payer: COMMERCIAL

## 2017-03-16 VITALS
TEMPERATURE: 97.5 F | BODY MASS INDEX: 26.07 KG/M2 | HEART RATE: 98 BPM | SYSTOLIC BLOOD PRESSURE: 117 MMHG | WEIGHT: 186.2 LBS | DIASTOLIC BLOOD PRESSURE: 71 MMHG | OXYGEN SATURATION: 96 % | HEIGHT: 71 IN

## 2017-03-16 DIAGNOSIS — G47.21 DELAYED SLEEP PHASE SYNDROME: ICD-10-CM

## 2017-03-16 DIAGNOSIS — R40.0 SLEEPINESS: ICD-10-CM

## 2017-03-16 DIAGNOSIS — R06.83 SNORING: Primary | ICD-10-CM

## 2017-03-16 PROCEDURE — 99204 OFFICE O/P NEW MOD 45 MIN: CPT | Performed by: PHYSICIAN ASSISTANT

## 2017-03-16 ASSESSMENT — PAIN SCALES - GENERAL: PAINLEVEL: NO PAIN (0)

## 2017-03-16 NOTE — LETTER
Date: 3/16/2017  Re: Kristen Hernandes      To Whom It May Concern,    Kristen Hernandes attended an appointment today at the [facility] for evaluation of daytime sleepiness. Testing has been ordered and he will return for further management.    Please feel free to call with questions or concerns at [phone].    Thank you,        Bennett Goltz, PA-C

## 2017-03-16 NOTE — PROGRESS NOTES
Sleep Consultation:    Date on this visit: 3/16/2017    Kristen Hernandes is sent by lAejandro Hernandez for a sleep consultation regarding insomnia.    Primary Physician: Alejandro Wells     Kristen Hernandes reports feeling tired upon awakening, and being told he is a restless sleeper for 5 years. His medical history is significant for depression, anxiety, ADD and several ED presentations for issues regarding alcohol use.    Kristen goes to sleep at 10:00 PM during the week. He wakes up at 6:00 AM with an alarm. If he gets up at 6 AM, he often will nap. He falls asleep in 60-90 minutes.  Kristen denies difficulty falling asleep.  He wakes up 1-2 times a night for 5 minutes before falling back to sleep.  Kristen wakes up to uncertain reasons.  On weekends, Kristen goes to sleep at 12:00 AM.  He wakes up at 10:00-11:00 AM with an alarm. He was a little vague about whether or not he falls asleep faster when he goes to bed later.  Patient gets an average of 8-10 hours of sleep per night. He has tried trazodone and lorazepam for sleep about 3 years ago. He now avoids caffeine and exercises regularly and that helps him sleep.     Patient does use electronics in bed, watch TV in bed and worry in bed about his anxiety and depression and does not read in bed. He sometimes worries about school. He has a hard time getting school work done when he is so tired all of the time.    Kristen is a full time student. He lives alone.  He just moved from [place]. He moved down here to see doctors to address his anxiety and depression.    Kristen does snore frequently. He does not know if he snores nightly or if it can be heard outside of the room. He is told his snoring can be sporadic. That is reported by a former girlfriend. He has not been observed while sleeping since they broke up a few years ago. She said he would also flail his feet between 2-4 AM. There is report of gasping.  He does have witnessed apneas.  Patient  sleeps on his back. He has occasional morning dry mouth and restless legs (not that often), denies snort arousals, morning headaches and morning confusion. Kristen has occasional sleep paralysis and hypnogogic/hypnopompic hallucinations and denies any sleep walking, sleep talking, dream enactment and cataplexy. He has nightmares that he someone is trying to grab him. He feels half awake and half in a dream and can't move. That happens once every couple of months.     Kristen has difficulty breathing through his nose, claustrophobia, depression and anxiety, denies reflux at night and heartburn.  He is on duloxetine 60 mg daily. He has an appointment with a new psychiatrist but does not remember the name of the provider. He has panic attacks occasionally. He does not wake with panic attacks. He had OCD mostly in childhood, which contributed to anxiety then. He now has anxiety and depression all of the time. He gets short of breath and palpitations when he lays down. He has had normal EKGs when previously evaluated for this. He also mentions he gets light headed if he takes a hot shower too quickly after waking.     Kristen has had weight fluctuations within about a 10 pound range in the last couple of years.  He just started a paleo diet so stopped drinking alcohol and cuts off caffeine after 2 PM. Patient describes themself as a night person.  He would prefer to go to sleep at 2:00 AM and wake up at 2-4:00 PM.  Patient's [place] Sleepiness score 7/24 inconsistent with daytime sleepiness.  He takes 30 mg Vyvanse daily for ADHD. It is somewhat helpful, but not that much.    Kristen naps 2 times per week for  minutes, feels refreshed after naps. He could nap more, but does not like to. He says it takes a little while to fall asleep for naps. He gets nauseous if he lays on his sides, worse in the day than at night. He takes no inadvertant naps.  He denies dozing while driving. Patient was counseled on the importance of  driving while alert, to pull over if drowsy, or nap before getting into the vehicle if sleepy.  He uses 2 cups/day of coffee. Last caffeine intake is usually before 2 PM. He does feel caffeine will keep him up if he has it too late.    Allergies:    Allergies   Allergen Reactions     Seasonal Allergies        Medications:    Current Outpatient Prescriptions   Medication Sig Dispense Refill     lisdexamfetamine (VYVANSE) 30 MG capsule Take 1 capsule (30 mg) by mouth daily 30 capsule 0     DULoxetine (CYMBALTA) 60 MG EC capsule Take 1 capsule (60 mg) by mouth daily 30 capsule 1     loratadine (CLARITIN) 10 MG tablet Take 10 mg by mouth daily       ALPRAZolam (XANAX) 0.5 MG tablet Take 1 tablet (0.5 mg) by mouth 3 times daily as needed for anxiety (Patient not taking: Reported on 3/16/2017) 60 tablet 0       Problem List:  Patient Active Problem List    Diagnosis Date Noted     ADD (attention deficit disorder) 01/05/2017     Priority: Medium     ROSANA (generalized anxiety disorder) 01/05/2017     Priority: Medium     Moderate episode of recurrent major depressive disorder (H) 01/05/2017     Priority: Medium     Psychophysiological insomnia 01/05/2017     Priority: Medium        Past Medical/Surgical History:  Past Medical History   Diagnosis Date     ADD (attention deficit disorder) 1/5/2017     Anxiety      Depressive disorder      Moderate episode of recurrent major depressive disorder (H) 1/5/2017     Past Surgical History   Procedure Laterality Date     Ent surgery       tonsillectomy       Social History:  Social History     Social History     Marital status: Single     Spouse name: N/A     Number of children: N/A     Years of education: N/A     Occupational History     Not on file.     Social History Main Topics     Smoking status: Never Smoker     Smokeless tobacco: Current User     Alcohol use 0.0 oz/week     0 Standard drinks or equivalent per week      Comment: weekends     Drug use: No     Sexual activity: Yes      Other Topics Concern     Not on file     Social History Narrative       Family History:  Family History   Problem Relation Age of Onset     DIABETES Mother      Depression Mother      Anxiety Disorder Mother      Other Cancer Paternal Grandfather        Review of Systems:  A complete review of systems reviewed by me is negative with the exeption of what has been mentioned in the history of present illness.  CONSTITUTIONAL: NEGATIVE for fever, chills, sweats or night sweats, drug allergies.  CONSTITUTIONAL:  POSITIVE for  weight gain and weight loss  EYES: NEGATIVE for changes in vision, blind spots, double vision.  ENT: NEGATIVE for sore throat, bloody nose  ENT:  POSITIVE for  ear pain, sinus pain, post-nasal drip and runny nose  CARDIAC: NEGATIVE for chest pain or pressure, swollen legs or swollen feet.  CARDIAC:  POSITIVE for  fast heart beats, fluttering in chest and breathlessness when lying flat  NEUROLOGIC: NEGATIVE headaches.  NEUROLOGIC:  POSITIVE for  weakness or numbness in the arms or legs  DERMATOLOGIC: NEGATIVE for rashes  DERMATOLOGIC:  POSITIVE for  new mole(s) or change in mole(s)  PULMONARY: NEGATIVE dry cough, coughing up blood, wheezing or whistling when breathing.    PULMONARY:  POSITIVE for  SOB at rest, SOB with activity and productive cough  GASTROINTESTINAL: NEGATIVE for nausea or vomitting, fat or grease in stools, bowel movements black in color or blood noted.  GASTROINTESTINAL:  POSITIVE for  loose or watery stools, constipation and abdominal pain  GENITOURINARY: NEGATIVE for pain during urination, blood in urine, irregular menstrual periods.  GENITOURINARY:  POSITIVE for  urinating more frequently than usual  MUSCULOSKELETAL: NEGATIVE for swollen joints.  MUSCULOSKELETAL:  POSITIVE for  muscle pain and bone or joint pain  ENDOCRINE: NEGATIVE for diabetes.  ENDOCRINE:  POSITIVE for  increased thirst or increased urination  LYMPHATIC: NEGATIVE for swollen lymph nodes, lumps or bumps in  "the breasts or nipple discharge.  MENTAL HEALTH: POSITIVE for anxiety, depression, OCD, ADHD    Physical Examination:  Vitals: /71 (BP Location: Right arm, Patient Position: Chair, Cuff Size: Adult Regular)  Pulse 98  Temp 97.5  F (36.4  C) (Oral)  Ht 1.803 m (5' 11\")  Wt 84.5 kg (186 lb 3.2 oz)  SpO2 96%  BMI 25.97 kg/m2  BMI= Body mass index is 25.97 kg/(m^2).    Neck Cir (cm): 39 cm    [place] Total Score 3/16/2017   Total score - [place] 7       GENERAL APPEARANCE: healthy, alert, no distress and cooperative  EYES: Eyes grossly normal to inspection, PERRL, conjunctivae and sclerae normal and lids and lashes normal  HENT: nose and mouth without ulcers or lesions, oral mucous membranes moist and oropharynx clear  NECK: no adenopathy, no asymmetry, masses, or scars, thyroid normal to palpation and trachea midline and normal to palpation  RESP: lungs clear to auscultation - no rales, rhonchi or wheezes  CV: regular rates and rhythm, normal S1 S2, no S3 or S4, no murmur, click or rub and no irregular beats  LYMPHATICS: normal ant/post cervical and supraclavicular nodes  MS: extremities normal- no gross deformities noted  NEURO: Normal strength and tone, mentation intact, speech normal and cranial nerves 2-12 intact  Mallampati Class: I.  Tonsillar Stage: 0  surgically removed.    Impression/Plan:    (R06.83) Snoring  (primary encounter diagnosis), (R40.0) Sleepiness  Comment: This patient presents with a main concern that he feels tired and can sleep for many hours without feeling refreshed. It has been challenging for him to complete his coursework, although he admittedly has very good grades. He has taken a medical leave from school to come to the [facility] to have his sleep and mood disorders treated. He has mild risk for JAYY. He snores sporadically and has been observed to gasp. He has not been closely observed while sleeping in about 3 years. He can nap for 1-2 hours a couple times per week. He would like to " nap more but tries to avoid them. He denies inadvertent dozing, but is on Vyvanse 30 mg. His ESS is 7/24.  His other risk factor is male gender. Negative risk factors include no HTN, BMI 25, age 26, neck 39 cm. He does mention sleep paralysis but denies cataplexy.   Plan: Comprehensive Sleep Study        Split night PSG with CPAP/BiPAP titration if indicated. A letter was written for his school to document that he is seeking treatment for the issues that caused him to take a medical leave.    (G47.21) Delayed sleep phase syndrome  Comment: On weekends, he will sleep midnight to 10-11 AM. It takes him about an hour to fall asleep. He has persistent depression and anxiety which are likely playing a role in his symptoms as well. He will be seeing a psychiatrist in [facility]. He has been on trazodone and lorazepam in the past. He is now exercising more regularly which helps him not need sedatives.  Plan: We discussed getting 30 minutes of bright light exposure in the morning, either with the sun or a SAD Lamp of 10,000 lux intensity. Avoid bright light in the evening. Take 1 mg melatonin 3-5 hours prior to natural sleep onset. Keep a consistent sleep schedule, avoiding naps and sleeping in.       Literature provided regarding sleep apnea and circadian rhythm disorders.      He will follow up with me in approximately two weeks after his sleep study has been competed to review the results and discuss plan of care.       Polysomnography reviewed.  Obstructive sleep apnea reviewed.  Complications of untreated sleep apnea were reviewed.    Bennett Goltz, PA-C    CC: Alejandro Carney*

## 2017-03-16 NOTE — NURSING NOTE
"Chief Complaint   Patient presents with     Sleep Problem     consult       Initial /71 (BP Location: Right arm, Patient Position: Chair, Cuff Size: Adult Regular)  Pulse 98  Temp 97.5  F (36.4  C) (Oral)  Ht 1.803 m (5' 11\")  Wt 84.5 kg (186 lb 3.2 oz)  SpO2 96%  BMI 25.97 kg/m2 Estimated body mass index is 25.97 kg/(m^2) as calculated from the following:    Height as of this encounter: 1.803 m (5' 11\").    Weight as of this encounter: 84.5 kg (186 lb 3.2 oz).  Medication Reconciliation: complete     Neck Circumference: 39 CM    ESS: 7  Taya Collier MA  [facility]      "

## 2017-03-16 NOTE — MR AVS SNAPSHOT
"              After Visit Summary   3/16/2017    Kristen Hernandes    MRN: [id number]           Patient Information     Date Of Birth          1990        Visit Information        Provider Department      3/16/2017 2:30 PM Goltz, Bennett Ezra, PA-C [facility]        Today's Diagnoses     Snoring    -  1    Sleepiness        Delayed sleep phase syndrome          Care Instructions    MY TREATMENT INFORMATION FOR SLEEP APNEA-  Kristen Hernandes    DOCTOR : Bennett Ezra Goltz, PA-C  SLEEP [facility] : [facility]     MY CONTACT NUMBER: [phone]    If I haven't had a sleep study yet, what can I expect?  A personal story from [place]  https://www.[employer].com/watch?v=AxPLmlRpnCs    Am I having a home sleep study?  Here is a video in case you get home and want to make sure you have done it correctly  https://www.[employer].com/watch?v=XFY3Q4aUmi4&feature=youtu.be  Frequently asked questions:  1. What is Obstructive Sleep Apnea (JAYY)? JAYY is the most common type of sleep apnea. Apnea literally means, \"without breath.\" It is characterized by repetitive pauses in breathing, despite continued effort to breathe, and is usually associated with a reduction in blood oxygen saturation. Apneas can last 10 to over 60 seconds. It is caused by narrowing or collapse of the upper airway as muscles relax during sleep. Severity of sleep apnea is determined by frequency of breathing events and their effect on your sleep and oxygen levels determined during sleep testing.   2. What are the consequences of JAYY? Symptoms include: daytime sleepiness- possibly increasing the risk of falling asleep while driving, unrefreshing/restless sleep, snoring, insomnia, waking frequently to urinate, waking with heartburn or reflux, reduced concentration and memory, and morning headaches. Other health consequences may include development of high blood pressure and other cardiovascular disease in persons who are susceptible. Untreated JAYY can contribute " to heart disease, stroke and diabetes.   3. What are the treatment options? In most situations, sleep apnea is a lifelong disease that must be managed with daily therapy. Medications are not effective for sleep apnea and surgery is generally not performed until other therapies have been tried. Therapy is usually tailored to the individual patient based on many factors including your wishes as well as severity of sleep apnea and severity of obesity. Continuous Positive Airway (CPAP) is the most reliable treatment. An oral device to hold your jaw forward is usually the next most reliable option. Other options include postioning devices (to keep you off your back), weight loss, and surgery including a tongue pacing device. There is more detail about some of these options below.    1. CPAP-  WHAT DOES IT DO AND HOW CAN I LEARN TO WEAR IT?                               BEFORE I START, CAN I WATCH A MOVIE TO GET A PLAN ON HOW TO USE CPAP?  https://www.[employer].com/watch?j=k1F99iq211F  Continuous positive airway pressure, or CPAP, is the most effective treatment for obstructive sleep apnea. It works by blowing room air, through a mask, to hold your throat open. A decision to use CPAP is a major step forward in the pursuit of a healthier life. The successful use of CPAP will help you breathe easier, sleep better and live healthier. You can choose CPAP equipment from any durable medical equipment provider that meets your needs.  Using CPAP can be a positive experience if you keep these pruett points in mind:  1. Commitment  CPAP is not a quick fix for your problem. It involves a long-term commitment to improve your sleep and your health.    2. Communication  Stay in close communication with both your sleep doctor and your CPAP supplier. Ask lots of questions and seek help when you need it.    3. Consistency  Use CPAP all night, every night and for every nap. You will receive the maximum health benefits from CPAP when you use it  "every time that you sleep. This will also make it easier for your body to adjust to the treatment.    4. Correction  The first machine and mask that you try may not be the best ones for you. Work with your sleep doctor and your CPAP supplier to make corrections to your equipment selection. Ask about trying a different type of machine or mask if you have ongoing problems. Make sure that your mask is a good fit and learn to use your equipment properly.    5. Challenge  Tell a family member or close friend to ask you each morning if you used your CPAP the previous night. Have someone to challenge you to give it your best effort.    6. Connection   Your adjustment to CPAP will be easier if you are able to connect with others who use the same treatment. Ask your sleep doctor if there is a support group in your area for people who have sleep apnea, or look for one on the Internet.  7. Comfort   Increase your level of comfort by using a saline spray, decongestant or heated humidifier if CPAP irritates your nose, mouth or throat. Use your unit's \"ramp\" setting to slowly get used to the air pressure level. There may be soft pads you can buy that will fit over your mask straps. Look on www.CPAP.com for accessories that can help make CPAP use more comfortable.  8. Cleaning   Clean your mask, tubing and headgear on a regular basis. Put this time in your schedule so that you don't forget to do it. Check and replace the filters for your CPAP unit and humidifier.    9. Completion   Although you are never finished with CPAP therapy, you should reward yourself by celebrating the completion of your first month of treatment. Expect this first month to be your hardest period of adjustment. It will involve some trial and error as you find the machine, mask and pressure settings that are right for you.    10. Continuation  After your first month of treatment, continue to make a daily commitment to use your CPAP all night, every night and " for every nap.    CPAP-Tips to starting with success:  Begin using your CPAP for short periods of time during the day while you watch TV or read.    Use CPAP every night and for every nap. Using it less often reduces the health benefits and makes it harder for your body to get used to it.    Make small adjustments to your mask, tubing, straps and headgear until you get the right fit. Tightening the mask may actually worsen the leak.  If it leaves significant marks on your face or irritates the bridge of your nose, it may not be the best mask for you.  Speak with the person who supplied the mask and consider trying other masks. Insurances will allow you to try different masks during the first month of starting CPAP.  Insurance also covers a new mask, hose and filter about every 6 months.    Use a saline nasal spray to ease mild nasal congestion. Neti-Pot or saline nasal rinses may also help. Nasal gel sprays can help reduce nasal dryness.  Biotene mouthwash can be helpful to protect your teeth if you experience frequent dry mouth.  Dry mouth may be a sign of air escaping out of your mouth or out of the mask in the case of a full face mask.  Speak with your provider if you expect that is the case.     Take a nasal decongestant to relieve more severe nasal or sinus congestion.  Do not use Afrin (oxymetazoline) nasal spray more than 3 days in a row.  Speak with your sleep doctor if your nasal congestion is chronic.    Use a heated humidifier that fits your CPAP model to enhance your breathing comfort. Adjust the heat setting up if you get a dry nose or throat, down if you get condensation in the hose or mask.  Position the CPAP lower than you so that any condensation in the hose drains back into the machine rather than towards the mask.    Try a system that uses nasal pillows if traditional masks give you problems.    Clean your mask, tubing and headgear once a week. Make sure the equipment dries fully.    Regularly  check and replace the filters for your CPAP unit and humidifier.    Work closely with your sleep provider and your CPAP supplier to make sure that you have the machine, mask and air pressure setting that works best for you. It is better to stop using it and call your provider to solve problems than to lay awake all night frustrated with the device.    BESIDES CPAP, WHAT OTHER THERAPIES ARE THERE?  Positioning Device  Positioning devices are generally used when sleep apnea is mild and only occurs on your back.This example shows a pillow that straps around the waist. It may be appropriate for those whose sleep study shows milder sleep apnea that occurs primarily when lying flat on one's back. Preliminary studies have shown benefit but effectiveness at home may need to be verified by a home sleep test. These devices are generally not covered by medical insurance.                      Oral Appliance  What is oral appliance therapy?  An oral appliance is a small acrylic device that fits over the upper and lower teeth or tongue (similar to an orthodontic retainer or a mouth guard). This device slightly advances the lower jaw or tongue, which moves the base of the tongue forward, opens the airway, improves breathing and can effectively treat snoring and obstructive sleep apnea sleep apnea. The appliance is fabricated and customized by a qualified dentist with experience in treating snoring and sleep apnea. Oral appliances are usually well tolerated and have relatively high compliance by patients1, 2, 3.  When is an oral appliance indicated?  Oral appliance therapy is recommended as a first-line treatment for patients with primary snoring, mild sleep apnea, and for patients with moderate sleep apnea who prefer appliance therapy to use of CPAP4, 5. Severity of sleep apnea is determined by sleep testing and is based on the number of respiratory events per hour of sleep.   How successful is oral appliance therapy?  The success  rate of oral appliance therapy in patients with mild sleep apnea is 75-80% while in patients with moderate sleep apnea it is 50-70%. The chance of success in patients with severe sleep apnea is 40-50%. The research also shows that oral appliances have a beneficial effect on the cardiovascular health of JAYY patients at the same magnitude as CPAP therapy7.  Oral appliances should be a second-line treatment in cases of severe sleep apnea, but if not completely successful then a combination therapy utilizing CPAP plus oral appliance therapy may be effective. Oral appliances tend to be effective in a broad range of patients although studies show that the patients who have the highest success are females, younger patients, those with milder disease, and less severe obesity. 3, 6.   The chances of success are lower in patients who have more severe JAYY, are older, and those who are morbidly obese.     Example of an oral appliance   Finding a dentist that practices dental sleep medicine  Specific training is available through the American Academy of Dental Sleep Medicine for dentists interested in working in the field of sleep. To find a dentist who is educated in the field of sleep and the use of oral appliances, near you, visit the Web site of the American Academy of Dental Sleep Medicine; also see   http://www.accpstorage.org/newOrganization/patients/oralAppliances.pdf  To search for a dentist certified in these practices:  Http://aadsm.org/FindADentist.aspx?1  1. Redd et al. Objectively measured vs self-reported compliance during oral appliance therapy for sleep-disordered breathing. Chest 2013; 144(5): 4285-6183.  2. Jojo et al. Objective measurement of compliance during oral appliance therapy for sleep-disordered breathing. Thorax 2013; 68(1): 91-96.  3. Akanksha et al. Mandibular advancement devices in 620 men and women with JAYY and snoring: tolerability and predictors of treatment success. Chest 2004;  125: 2434-3542.  4. Lisa et al. Oral appliances for snoring and JAYY: a review. Sleep 2006; 29: 244-262.  5. Varun et al. Oral appliance treatment for JAYY: an update. J Clin Sleep Med 2014; 10(2): 215-227.  6. Alfie et al. Predictors of OSAH treatment outcome. J Dent Res 2007; 86: 4258-4242.  Weight Loss:    Weight management is a personal decision.  If you are interested in exploring weight loss strategies, the following discussion covers the impact on weight loss on sleep apnea and the approaches that may be successful.    Weight loss decreases severity of sleep apnea in most people with obesity. For those with mild obesity who have developed snoring with weight gain, even 15-30 pound weight loss can improve and occasionally eliminate sleep apnea.  Structured and life-long dietary and health habits are necessary to lose weight and keep healthier weight levels.     Though there may be significant health benefits from weight loss, long-term weight loss is very difficult to achieve- studies show success with dietary management in less than 10% of people. In addition, substantial weight loss may require years of dietary control and may be difficult if patients have severe obesity. In these cases, surgical management may be considered.  Finally, older individuals who have tolerated obesity without health complications may be less likely to benefit from weight loss strategies.    Your BMI is Body mass index is 25.97 kg/(m^2).  Body mass index (BMI) is one way to tell whether you are at a healthy weight, overweight, or obese. It measures your weight in relation to your height.  A BMI of 18.5 to 24.9 is in the healthy range. A person with a BMI of 25 to 29.9 is considered overweight, and someone with a BMI of 30 or greater is considered obese. More than two-thirds of American adults are considered overweight or obese.  Being overweight or obese increases the risk for further weight gain. Excess weight may  lead to heart disease and diabetes.  Creating and following plans for healthy eating and physical activity may help you improve your health.  Weight control is part of healthy lifestyle and includes exercise, emotional health, and healthy eating habits. Careful eating habits lifelong are the mainstay of weight control. Though there are significant health benefits from weight loss, long-term weight loss with diet alone may be very difficult to achieve- studies show long-term success with dietary management in less than 10% of people. Attaining a healthy weight may be especially difficult to achieve in those with severe obesity. In some cases, medications, devices and surgical management might be considered.  What can you do?  If you are overweight or obese and are interested in methods for weight loss, you should discuss this with your provider.     Consider reducing daily calorie intake by 500 calories.     Keep a food journal.     Avoiding skipping meals, consider cutting portions instead.    Diet combined with exercise helps maintain muscle while optimizing fat loss. Strength training is particularly important for building and maintaining muscle mass. Exercise helps reduce stress, increase energy, and improves fitness. Increasing exercise without diet control, however, may not burn enough calories to loose weight.       Start walking three days a week 10-20 minutes at a time    Work towards walking thirty minutes five days a week     Eventually, increase the speed of your walking for 1-2 minutes at time    In addition, we recommend that you review healthy lifestyles and methods for weight loss available through the National Institutes of Health patient information sites:  http://win.niddk.nih.gov/publications/index.htm    And look into health and wellness programs that may be available through your health insurance provider, employer, local community center, or chava club.    Weight management plan: Patient is not  overweight  Surgery:  Upper Airway Surgery for JAYY  Surgery for JAYY is a second-line treatment option in the management of sleep apnea.  Surgery should be considered for patients who are having a difficult time tolerating CPAP.    Surgery for JAYY is directed at areas that are responsible for narrowing or complete obstruction of the airway during sleep.  There are a wide range of procedures available to enlarge and/or stabilize the airway to prevent blockage of breathing in the three major areas where it can occur: the palate, tongue, and nasal regions.  Successful surgical treatment depends on the accurate identification of the factors responsible for obstructive sleep apnea in each person.  A personalized approach is required because there is no single treatment that works well for everyone.  Because of anatomic variation, consultation with an examination by a sleep surgeon is a critical first step in determining what surgical options are best for each patient.  In some cases, examination during sedation may be recommended in order to guide the selection of procedures.  Patients will be counseled about risks and benefits as well as the typical recovery course after surgery. Surgery is typically not a cure for a person s JAYY.  However, surgery will often significantly improve one s JAYY severity (termed  success rate ).  Even in the absence of a cure, surgery will decrease the cardiovascular risk associated with OSA7; improve overall quality of life8 (sleepiness, functionality, sleep quality, etc).      Palate Procedures:  Patients with JAYY often have narrowing of their airway in the region of their tonsils and uvula.  The goals of palate procedures are to widen the airway in this region as well as to help the tissues resist collapse.  Modern palate procedure techniques focus on tissue conservation and soft tissue rearrangement, rather than tissue removal.  Often the uvula is preserved in this procedure. Residual  sleep apnea is common in patient after pharyngoplasty with an average reduction in sleep apnea events of 33%2.      Tongue Procedures:  While patients are awake, the muscles that surround the throat are active and keep this region open for breathing. These muscles relax during sleep, allowing the tongue and other structures to collapse and block breathing.  There are several different tongue procedures available.  Selection of a tongue base procedure depends on characteristics seen on physical exam.  Generally, procedures are aimed at removing bulky tissues in this area or preventing the back of the tongue from falling back during sleep.  Success rates for tongue surgery range from 50-62%3.    Hypoglossal Nerve Stimulation:  Hypoglossal nerve stimulation has recently received approval from the [place] Food and Drug Administration for the treatment of obstructive sleep apnea.  This is based on research showing that the system was safe and effective in treating sleep apnea6.  Results showed that the median AHI score decreased 68%, from 29.3 to 9.0. This therapy uses an implant system that senses breathing patterns and delivers mild stimulation to airway muscles, which keeps the airway open during sleep.  The system consists of three fully implanted components: a small generator (similar in size to a pacemaker), a breathing sensor, and a stimulation lead.  Using a small handheld remote, a patient turns the therapy on before bed and off upon awakening.    Candidates for this device must be greater than 22 years of age, have moderate to severe JAYY (AHI between 20-65), BMI less than 32, have tried CPAP/oral appliance without success, and have appropriate upper airway anatomy (determined by a sleep endoscopy performed by Dr. Batres).    Hypoglossal Nerve Stimulation Pathway:    The sleep surgeon s office will work with the patient through the insurance prior-authorization process (including communications and appeals).     Nasal Procedures:  Nasal obstruction can interfere with nasal breathing during the day and night.  Studies have shown that relief of nasal obstruction can improve the ability of some patients to tolerate positive airway pressure therapy for obstructive sleep apnea1.  Treatment options include medications such as nasal saline, topical corticosteroid and antihistamine sprays, and oral medications such as antihistamines or decongestants. Non-surgical treatments can include external nasal dilators for selected patients. If these are not successful by themselves, surgery can improve the nasal airway either alone or in combination with these other options.  Combination Procedures:  Combination of surgical procedures and other treatments may be recommended, particularly if patients have more than one area of narrowing or persistent positional disease.  The success rate of combination surgery ranges from 66-80%2,3.    1. Klaus KELLER. The Role of the Nose in Snoring and Obstructive Sleep Apnoea: An Update.  Eur Arch Otorhinolaryngol. 2011; 268: 1365-73.  2.  Jeffrey SM; Teresa JA; Puneet JR; Pallanch JF; Mary MB; Shantal SG; Amita MACE. Surgical modifications of the upper airway for obstructive sleep apnea in adults: a systematic review and meta-analysis. SLEEP 2010;33(10):0372-0306. Carolyn HINES. Hypopharyngeal surgery in obstructive sleep apnea: an evidence-based medicine review.  Arch Otolaryngol Head Neck Surg. 2006 Feb;132(2):206-13.  3. Hussain YH1, Yaima Y, Ketan ROSE MARIE. The efficacy of anatomically based multilevel surgery for obstructive sleep apnea. Otolaryngol Head Neck Surg. 2003 Oct;129(4):327-35.  4. Carolyn HINES, Goldberg A. Hypopharyngeal Surgery in Obstructive Sleep Apnea: An Evidence-Based Medicine Review. Arch Otolaryngol Head Neck Surg. 2006 Feb;132(2):206-13.  5. Ender HUBER et al. Upper-Airway Stimulation for Obstructive Sleep Apnea.  N Engl J Med. 2014 Jan 9;370(2):139-49.  6. Christine NEVILLE et al. Increased Incidence  of Cardiovascular Disease in Middle-aged Men with Obstructive Sleep Apnea. Am J Respir Crit Care Med; 2002 166: 159-165  7. Opal HAJI et al. Studying Life Effects and Effectiveness of Palatopharyngoplasty (SLEEP) study: Subjective Outcomes of Isolated Uvulopalatopharyngoplasty. Otolaryngol Head Neck Surg. 2011; 144: 623-631.    Instructions for treating Delayed Sleep Phase Syndrome:    Delayed Sleep Phase Syndrome (DSPS) means that your body's internal timing is set late compared to the 24 hour day. Therefore, it is often difficult to get up on time for work in the morning and sometimes difficult to fall asleep on time, in order to get enough sleep. People with DSPS often tend to like to stay up late on weekends and sleep in until between 10 AM and noon, sometimes even later.This is actually a bad habit that will perpetuate the problem. It reinforces your body's tendency to be on that later schedule.    You should go to bed when you are sleepy and ready to sleep. During this entire process, you should not engage in activities that may make it worse, such as watching TV in bed, leaving the TV on all night, drinking any caffeine 6 hours before bed or exercising 1-2 hours before bed.     Start taking Melatonin, 1 mg tablet 3-5 hours before the time that you fall asleep on average (not your desired bedtime or time that you get in bed, but the time you normally fall asleep on your own).     Upon awakening, get exposure to sun-light for about 30-45 minutes. You do not need to look at the sun, in fact, this is dangerous. Reading the paper with the sun shining on you is adequate.  Alternatively, you may use a Seasonal Affective Disorder Lamp (intensity 10,000 Lux) instead of the sun. The lamp should be positioned 1-2 arms lengths away from you. They lamps are sold at Home [employer] Companies such as [employer] or [employer]. A prescription can be written to get insurance coverage in some cases. They are also  sold on [employer].com.    Using the light and melatonin should help march your internal clock (known as your circadian rhythms) gradually earlier. As your bedtime advances, remember to take your melatonin earlier, keeping it 5 hours before your fall asleep time.    Avoid naps and sleeping in because sleeping during the day will delay your body's clock and you will have to start from scratch.     More information about light therapy:  If the cost of any light box is too much, you can also purchase a compact fluorescent all spectrum light bulb at a local hardware store for about $8.  The most commonly available bulb is 1400 lumen.  You would need two of these positioned within 1 meter of yourself to be equivalent to 2,500 lux.  The bulbs can be placed in a standard light fixture.  Additionally, they can be placed in a mountable fixture that is used in greenhouses.  Mountable fixtures are also available at hardware stores for about $9.  Do not look directly at the light.  If you have any concerns regarding the safety of bright light therapy for you, it is recommended that you consult an ophthalmologist before using a light box.  If you have a condition that makes your eyes very sensitive to light, macular degeneration, a family history of such problems, or diabetic changes to your eyes, consult an ophthalmologist before using a light box. If you have anxiety disorder and have an increase in anxiety discontinue use.                        Follow-ups after your visit        Follow-up notes from your care team     Return in about 2 weeks (around 3/30/2017) for Sleep Study Review.      Your next 10 appointments already scheduled     Apr 10, 2017  8:30 PM CDT   PSG Split with BED 3  SLEEP   [facility] ([facility])    [address] [place] [id number]   [phone]            Apr 24, 2017  1:30 PM CDT   Return Sleep Patient with Bennett Ezra Goltz, PA-C   [place]  [facility] ([facility])    [address] [id number] [phone]              Future tests that were ordered for you today     Open Future Orders        Priority Expected Expires Ordered    Comprehensive Sleep Study Routine  2017 3/16/2017            Who to contact     If you have questions or need follow up information about today's clinic visit or your schedule please contact [facility] directly at [phone].  Normal or non-critical lab and imaging results will be communicated to you by [employer] (Inspect Manager)hart, letter or phone within 4 business days after the clinic has received the results. If you do not hear from us within 7 days, please contact the clinic through CYPH[employer] or phone. If you have a critical or abnormal lab result, we will notify you by phone as soon as possible.  Submit refill requests through Zulu or call your pharmacy and they will forward the refill request to us. Please allow 3 business days for your refill to be completed.          Additional Information About Your Visit        Zulu Information     Zulu lets you send messages to your doctor, view your test results, renew your prescriptions, schedule appointments and more. To sign up, go to [email or web address] . Click on \"Log in\" on the left side of the screen, which will take you to the Welcome page. Then click on \"Sign up Now\" on the right side of the page.     You will be asked to enter the access code listed below, as well as some personal information. Please follow the directions to create your username and password.     Your access code is: [email or web address]  Expires: 4/15/2017  5:18 AM     Your access code will  in 90 days. If you need help or a new code, please call your [facility] clinic or [phone].        Care EveryWhere ID     This is your Care EveryWhere ID. This could be used by other organizations to access your [facility] medical " "records  [id number]        Your Vitals Were     Pulse Temperature Height Pulse Oximetry BMI (Body Mass Index)       98 97.5  F (36.4  C) (Oral) 1.803 m (5' 11\") 96% 25.97 kg/m2        Blood Pressure from Last 3 Encounters:   03/16/17 117/71   03/05/17 145/70   02/25/17 110/69    Weight from Last 3 Encounters:   03/16/17 84.5 kg (186 lb 3.2 oz)   02/25/17 83.9 kg (185 lb)   02/07/17 84.2 kg (185 lb 9.6 oz)                 Today's Medication Changes          These changes are accurate as of: 3/16/17  3:58 PM.  If you have any questions, ask your nurse or doctor.               Stop taking these medicines if you haven't already. Please contact your care team if you have questions.     LORazepam 1 MG tablet   Commonly known as:  ATIVAN   Stopped by:  Goltz, Bennett Ezra, PA-C           MUCINEX PO   Stopped by:  Goltz, Bennett Ezra, PA-C           SUDAFED PO   Stopped by:  Goltz, Bennett Ezra, PA-C                    Primary Care Provider Office Phone # Fax #    Alejandro Ashu Wells [phone]       [facility] [address] [facility] [address]  [facility] [address]        Thank you!     Thank you for choosing [facility]  for your care. Our goal is always to provide you with excellent care. Hearing back from our patients is one way we can continue to improve our services. Please take a few minutes to complete the written survey that you may receive in the mail after your visit with us. Thank you!             Your Updated Medication List - Protect others around you: Learn how to safely use, store and throw away your medicines at www.disposemymeds.org.          This list is accurate as of: 3/16/17  3:58 PM.  Always use your most recent med list.                   Brand Name Dispense Instructions for use    DULoxetine 60 MG EC capsule    CYMBALTA    30 capsule    Take 1 capsule (60 mg) by mouth daily       lisdexamfetamine 30 MG capsule    VYVANSE    30 capsule    Take 1 " capsule (30 mg) by mouth daily       loratadine 10 MG tablet    CLARITIN     Take 10 mg by mouth daily

## 2017-03-16 NOTE — PATIENT INSTRUCTIONS
"MY TREATMENT INFORMATION FOR SLEEP APNEA-  Kristen Hernandes    DOCTOR : Bennett Ezra Goltz, PA-C  [facility] : [facility]     MY CONTACT NUMBER: [phone]    If I haven't had a sleep study yet, what can I expect?  A personal story from Nikhil  https://www.D[employer] Kidz.com/watch?v=AxPLmlRpnCs    Am I having a home sleep study?  Here is a video in case you get home and want to make sure you have done it correctly  https://www.MiTioube.com/watch?v=MGF1E8nJfy4&feature=youtu.be  Frequently asked questions:  1. What is Obstructive Sleep Apnea (JAYY)? JAYY is the most common type of sleep apnea. Apnea literally means, \"without breath.\" It is characterized by repetitive pauses in breathing, despite continued effort to breathe, and is usually associated with a reduction in blood oxygen saturation. Apneas can last 10 to over 60 seconds. It is caused by narrowing or collapse of the upper airway as muscles relax during sleep. Severity of sleep apnea is determined by frequency of breathing events and their effect on your sleep and oxygen levels determined during sleep testing.   2. What are the consequences of JAYY? Symptoms include: daytime sleepiness- possibly increasing the risk of falling asleep while driving, unrefreshing/restless sleep, snoring, insomnia, waking frequently to urinate, waking with heartburn or reflux, reduced concentration and memory, and morning headaches. Other health consequences may include development of high blood pressure and other cardiovascular disease in persons who are susceptible. Untreated JAYY can contribute to heart disease, stroke and diabetes.   3. What are the treatment options? In most situations, sleep apnea is a lifelong disease that must be managed with daily therapy. Medications are not effective for sleep apnea and surgery is generally not performed until other therapies have been tried. Therapy is usually tailored to the individual patient based on many factors including your wishes as well as " severity of sleep apnea and severity of obesity. Continuous Positive Airway (CPAP) is the most reliable treatment. An oral device to hold your jaw forward is usually the next most reliable option. Other options include postioning devices (to keep you off your back), weight loss, and surgery including a tongue pacing device. There is more detail about some of these options below.    1. CPAP-  WHAT DOES IT DO AND HOW CAN I LEARN TO WEAR IT?                               BEFORE I START, CAN I WATCH A MOVIE TO GET A PLAN ON HOW TO USE CPAP?  https://www.Spireon.com/watch?v=p1B59ml133H  Continuous positive airway pressure, or CPAP, is the most effective treatment for obstructive sleep apnea. It works by blowing room air, through a mask, to hold your throat open. A decision to use CPAP is a major step forward in the pursuit of a healthier life. The successful use of CPAP will help you breathe easier, sleep better and live healthier. You can choose CPAP equipment from any durable medical equipment provider that meets your needs.  Using CPAP can be a positive experience if you keep these pruett points in mind:  1. Commitment  CPAP is not a quick fix for your problem. It involves a long-term commitment to improve your sleep and your health.    2. Communication  Stay in close communication with both your sleep doctor and your CPAP supplier. Ask lots of questions and seek help when you need it.    3. Consistency  Use CPAP all night, every night and for every nap. You will receive the maximum health benefits from CPAP when you use it every time that you sleep. This will also make it easier for your body to adjust to the treatment.    4. Correction  The first machine and mask that you try may not be the best ones for you. Work with your sleep doctor and your CPAP supplier to make corrections to your equipment selection. Ask about trying a different type of machine or mask if you have ongoing problems. Make sure that your mask is a  "good fit and learn to use your equipment properly.    5. Challenge  Tell a family member or close friend to ask you each morning if you used your CPAP the previous night. Have someone to challenge you to give it your best effort.    6. Connection   Your adjustment to CPAP will be easier if you are able to connect with others who use the same treatment. Ask your sleep doctor if there is a support group in your area for people who have sleep apnea, or look for one on the Internet.  7. Comfort   Increase your level of comfort by using a saline spray, decongestant or heated humidifier if CPAP irritates your nose, mouth or throat. Use your unit's \"ramp\" setting to slowly get used to the air pressure level. There may be soft pads you can buy that will fit over your mask straps. Look on www.CPAP.com for accessories that can help make CPAP use more comfortable.  8. Cleaning   Clean your mask, tubing and headgear on a regular basis. Put this time in your schedule so that you don't forget to do it. Check and replace the filters for your CPAP unit and humidifier.    9. Completion   Although you are never finished with CPAP therapy, you should reward yourself by celebrating the completion of your first month of treatment. Expect this first month to be your hardest period of adjustment. It will involve some trial and error as you find the machine, mask and pressure settings that are right for you.    10. Continuation  After your first month of treatment, continue to make a daily commitment to use your CPAP all night, every night and for every nap.    CPAP-Tips to starting with success:  Begin using your CPAP for short periods of time during the day while you watch TV or read.    Use CPAP every night and for every nap. Using it less often reduces the health benefits and makes it harder for your body to get used to it.    Make small adjustments to your mask, tubing, straps and headgear until you get the right fit. Tightening the " mask may actually worsen the leak.  If it leaves significant marks on your face or irritates the bridge of your nose, it may not be the best mask for you.  Speak with the person who supplied the mask and consider trying other masks. Insurances will allow you to try different masks during the first month of starting CPAP.  Insurance also covers a new mask, hose and filter about every 6 months.    Use a saline nasal spray to ease mild nasal congestion. Neti-Pot or saline nasal rinses may also help. Nasal gel sprays can help reduce nasal dryness.  Biotene mouthwash can be helpful to protect your teeth if you experience frequent dry mouth.  Dry mouth may be a sign of air escaping out of your mouth or out of the mask in the case of a full face mask.  Speak with your provider if you expect that is the case.     Take a nasal decongestant to relieve more severe nasal or sinus congestion.  Do not use Afrin (oxymetazoline) nasal spray more than 3 days in a row.  Speak with your sleep doctor if your nasal congestion is chronic.    Use a heated humidifier that fits your CPAP model to enhance your breathing comfort. Adjust the heat setting up if you get a dry nose or throat, down if you get condensation in the hose or mask.  Position the CPAP lower than you so that any condensation in the hose drains back into the machine rather than towards the mask.    Try a system that uses nasal pillows if traditional masks give you problems.    Clean your mask, tubing and headgear once a week. Make sure the equipment dries fully.    Regularly check and replace the filters for your CPAP unit and humidifier.    Work closely with your sleep provider and your CPAP supplier to make sure that you have the machine, mask and air pressure setting that works best for you. It is better to stop using it and call your provider to solve problems than to lay awake all night frustrated with the device.    BESIDES CPAP, WHAT OTHER THERAPIES ARE  THERE?  Positioning Device  Positioning devices are generally used when sleep apnea is mild and only occurs on your back.This example shows a pillow that straps around the waist. It may be appropriate for those whose sleep study shows milder sleep apnea that occurs primarily when lying flat on one's back. Preliminary studies have shown benefit but effectiveness at home may need to be verified by a home sleep test. These devices are generally not covered by medical insurance.                      Oral Appliance  What is oral appliance therapy?  An oral appliance is a small acrylic device that fits over the upper and lower teeth or tongue (similar to an orthodontic retainer or a mouth guard). This device slightly advances the lower jaw or tongue, which moves the base of the tongue forward, opens the airway, improves breathing and can effectively treat snoring and obstructive sleep apnea sleep apnea. The appliance is fabricated and customized by a qualified dentist with experience in treating snoring and sleep apnea. Oral appliances are usually well tolerated and have relatively high compliance by patients1, 2, 3.  When is an oral appliance indicated?  Oral appliance therapy is recommended as a first-line treatment for patients with primary snoring, mild sleep apnea, and for patients with moderate sleep apnea who prefer appliance therapy to use of CPAP4, 5. Severity of sleep apnea is determined by sleep testing and is based on the number of respiratory events per hour of sleep.   How successful is oral appliance therapy?  The success rate of oral appliance therapy in patients with mild sleep apnea is 75-80% while in patients with moderate sleep apnea it is 50-70%. The chance of success in patients with severe sleep apnea is 40-50%. The research also shows that oral appliances have a beneficial effect on the cardiovascular health of JAYY patients at the same magnitude as CPAP therapy7.  Oral appliances should be a  second-line treatment in cases of severe sleep apnea, but if not completely successful then a combination therapy utilizing CPAP plus oral appliance therapy may be effective. Oral appliances tend to be effective in a broad range of patients although studies show that the patients who have the highest success are females, younger patients, those with milder disease, and less severe obesity. 3, 6.   The chances of success are lower in patients who have more severe JAYY, are older, and those who are morbidly obese.     Example of an oral appliance   Finding a dentist that practices dental sleep medicine  Specific training is available through the American Academy of Dental Sleep Medicine for dentists interested in working in the field of sleep. To find a dentist who is educated in the field of sleep and the use of oral appliances, near you, visit the Web site of the American Academy of Dental Sleep Medicine; also see   http://www.accpstorage.org/newOrganization/patients/oralAppliances.pdf  To search for a dentist certified in these practices:  Http://aadsm.org/FindADentist.aspx?1  1. Redd et al. Objectively measured vs self-reported compliance during oral appliance therapy for sleep-disordered breathing. Chest 2013; 144(5): 2411-1748.  2. Jojo, et al. Objective measurement of compliance during oral appliance therapy for sleep-disordered breathing. Thorax 2013; 68(1): 91-96.  3. Akanksha, et al. Mandibular advancement devices in 620 men and women with JAYY and snoring: tolerability and predictors of treatment success. Chest 2004; 125: 7944-0632.  4. Lisa, et al. Oral appliances for snoring and JAYY: a review. Sleep 2006; 29: 244-262.  5. Varun et al. Oral appliance treatment for JAYY: an update. J Clin Sleep Med 2014; 10(2): 215-227.  6. Alfie et al. Predictors of OSAH treatment outcome. J Dent Res 2007; 86: 4593-3020.  Weight Loss:    Weight management is a personal decision.  If you are  interested in exploring weight loss strategies, the following discussion covers the impact on weight loss on sleep apnea and the approaches that may be successful.    Weight loss decreases severity of sleep apnea in most people with obesity. For those with mild obesity who have developed snoring with weight gain, even 15-30 pound weight loss can improve and occasionally eliminate sleep apnea.  Structured and life-long dietary and health habits are necessary to lose weight and keep healthier weight levels.     Though there may be significant health benefits from weight loss, long-term weight loss is very difficult to achieve- studies show success with dietary management in less than 10% of people. In addition, substantial weight loss may require years of dietary control and may be difficult if patients have severe obesity. In these cases, surgical management may be considered.  Finally, older individuals who have tolerated obesity without health complications may be less likely to benefit from weight loss strategies.    Your BMI is Body mass index is 25.97 kg/(m^2).  Body mass index (BMI) is one way to tell whether you are at a healthy weight, overweight, or obese. It measures your weight in relation to your height.  A BMI of 18.5 to 24.9 is in the healthy range. A person with a BMI of 25 to 29.9 is considered overweight, and someone with a BMI of 30 or greater is considered obese. More than two-thirds of American adults are considered overweight or obese.  Being overweight or obese increases the risk for further weight gain. Excess weight may lead to heart disease and diabetes.  Creating and following plans for healthy eating and physical activity may help you improve your health.  Weight control is part of healthy lifestyle and includes exercise, emotional health, and healthy eating habits. Careful eating habits lifelong are the mainstay of weight control. Though there are significant health benefits from weight  loss, long-term weight loss with diet alone may be very difficult to achieve- studies show long-term success with dietary management in less than 10% of people. Attaining a healthy weight may be especially difficult to achieve in those with severe obesity. In some cases, medications, devices and surgical management might be considered.  What can you do?  If you are overweight or obese and are interested in methods for weight loss, you should discuss this with your provider.     Consider reducing daily calorie intake by 500 calories.     Keep a food journal.     Avoiding skipping meals, consider cutting portions instead.    Diet combined with exercise helps maintain muscle while optimizing fat loss. Strength training is particularly important for building and maintaining muscle mass. Exercise helps reduce stress, increase energy, and improves fitness. Increasing exercise without diet control, however, may not burn enough calories to loose weight.       Start walking three days a week 10-20 minutes at a time    Work towards walking thirty minutes five days a week     Eventually, increase the speed of your walking for 1-2 minutes at time    In addition, we recommend that you review healthy lifestyles and methods for weight loss available through the National Institutes of Health patient information sites:  http://win.niddk.nih.gov/publications/index.htm    And look into health and wellness programs that may be available through your health insurance provider, employer, local community center, or chava club.    Weight management plan: Patient is not overweight  Surgery:  Upper Airway Surgery for JAYY  Surgery for JAYY is a second-line treatment option in the management of sleep apnea.  Surgery should be considered for patients who are having a difficult time tolerating CPAP.    Surgery for JAYY is directed at areas that are responsible for narrowing or complete obstruction of the airway during sleep.  There are a wide  range of procedures available to enlarge and/or stabilize the airway to prevent blockage of breathing in the three major areas where it can occur: the palate, tongue, and nasal regions.  Successful surgical treatment depends on the accurate identification of the factors responsible for obstructive sleep apnea in each person.  A personalized approach is required because there is no single treatment that works well for everyone.  Because of anatomic variation, consultation with an examination by a sleep surgeon is a critical first step in determining what surgical options are best for each patient.  In some cases, examination during sedation may be recommended in order to guide the selection of procedures.  Patients will be counseled about risks and benefits as well as the typical recovery course after surgery. Surgery is typically not a cure for a person s JAYY.  However, surgery will often significantly improve one s JAYY severity (termed  success rate ).  Even in the absence of a cure, surgery will decrease the cardiovascular risk associated with OSA7; improve overall quality of life8 (sleepiness, functionality, sleep quality, etc).      Palate Procedures:  Patients with JAYY often have narrowing of their airway in the region of their tonsils and uvula.  The goals of palate procedures are to widen the airway in this region as well as to help the tissues resist collapse.  Modern palate procedure techniques focus on tissue conservation and soft tissue rearrangement, rather than tissue removal.  Often the uvula is preserved in this procedure. Residual sleep apnea is common in patient after pharyngoplasty with an average reduction in sleep apnea events of 33%2.      Tongue Procedures:  While patients are awake, the muscles that surround the throat are active and keep this region open for breathing. These muscles relax during sleep, allowing the tongue and other structures to collapse and block breathing.  There are  several different tongue procedures available.  Selection of a tongue base procedure depends on characteristics seen on physical exam.  Generally, procedures are aimed at removing bulky tissues in this area or preventing the back of the tongue from falling back during sleep.  Success rates for tongue surgery range from 50-62%3.    Hypoglossal Nerve Stimulation:  Hypoglossal nerve stimulation has recently received approval from the [place] Food and Drug Administration for the treatment of obstructive sleep apnea.  This is based on research showing that the system was safe and effective in treating sleep apnea6.  Results showed that the median AHI score decreased 68%, from 29.3 to 9.0. This therapy uses an implant system that senses breathing patterns and delivers mild stimulation to airway muscles, which keeps the airway open during sleep.  The system consists of three fully implanted components: a small generator (similar in size to a pacemaker), a breathing sensor, and a stimulation lead.  Using a small handheld remote, a patient turns the therapy on before bed and off upon awakening.    Candidates for this device must be greater than 22 years of age, have moderate to severe JAYY (AHI between 20-65), BMI less than 32, have tried CPAP/oral appliance without success, and have appropriate upper airway anatomy (determined by a sleep endoscopy performed by Dr. Batres).    Hypoglossal Nerve Stimulation Pathway:    The sleep surgeon s office will work with the patient through the insurance prior-authorization process (including communications and appeals).    Nasal Procedures:  Nasal obstruction can interfere with nasal breathing during the day and night.  Studies have shown that relief of nasal obstruction can improve the ability of some patients to tolerate positive airway pressure therapy for obstructive sleep apnea1.  Treatment options include medications such as nasal saline, topical corticosteroid and  antihistamine sprays, and oral medications such as antihistamines or decongestants. Non-surgical treatments can include external nasal dilators for selected patients. If these are not successful by themselves, surgery can improve the nasal airway either alone or in combination with these other options.  Combination Procedures:  Combination of surgical procedures and other treatments may be recommended, particularly if patients have more than one area of narrowing or persistent positional disease.  The success rate of combination surgery ranges from 66-80%2,3.    1. Klaus KELLER. The Role of the Nose in Snoring and Obstructive Sleep Apnoea: An Update.  Eur Arch Otorhinolaryngol. 2011; 268: 1365-73.  2.  Jeffrey SM; Teresa JA; Puneet JR; Pallanch JF; Mary MB; Shantal SG; Amita MACE. Surgical modifications of the upper airway for obstructive sleep apnea in adults: a systematic review and meta-analysis. SLEEP 2010;33(10):9107-8212. Carolyn HINES. Hypopharyngeal surgery in obstructive sleep apnea: an evidence-based medicine review.  Arch Otolaryngol Head Neck Surg. 2006 Feb;132(2):206-13.  3. Hussain YH1, Yaima Y, Ketan ROSE MARIE. The efficacy of anatomically based multilevel surgery for obstructive sleep apnea. Otolaryngol Head Neck Surg. 2003 Oct;129(4):327-35.  4. Carolyn HINES, Goldberg A. Hypopharyngeal Surgery in Obstructive Sleep Apnea: An Evidence-Based Medicine Review. Arch Otolaryngol Head Neck Surg. 2006 Feb;132(2):206-13.  5. Ender HUBER et al. Upper-Airway Stimulation for Obstructive Sleep Apnea.  N Engl J Med. 2014 Jan 9;370(2):139-49.  6. Christine Y et al. Increased Incidence of Cardiovascular Disease in Middle-aged Men with Obstructive Sleep Apnea. Am J Respir Crit Care Med; 2002 166: 159-165  7. Opal HAJI et al. Studying Life Effects and Effectiveness of Palatopharyngoplasty (SLEEP) study: Subjective Outcomes of Isolated Uvulopalatopharyngoplasty. Otolaryngol Head Neck Surg. 2011; 144: 623-631.    Instructions for treating  Delayed Sleep Phase Syndrome:    Delayed Sleep Phase Syndrome (DSPS) means that your body's internal timing is set late compared to the 24 hour day. Therefore, it is often difficult to get up on time for work in the morning and sometimes difficult to fall asleep on time, in order to get enough sleep. People with DSPS often tend to like to stay up late on weekends and sleep in until between 10 AM and noon, sometimes even later.This is actually a bad habit that will perpetuate the problem. It reinforces your body's tendency to be on that later schedule.    You should go to bed when you are sleepy and ready to sleep. During this entire process, you should not engage in activities that may make it worse, such as watching TV in bed, leaving the TV on all night, drinking any caffeine 6 hours before bed or exercising 1-2 hours before bed.     Start taking Melatonin, 1 mg tablet 3-5 hours before the time that you fall asleep on average (not your desired bedtime or time that you get in bed, but the time you normally fall asleep on your own).     Upon awakening, get exposure to sun-light for about 30-45 minutes. You do not need to look at the sun, in fact, this is dangerous. Reading the paper with the sun shining on you is adequate.  Alternatively, you may use a Seasonal Affective Disorder Lamp (intensity 10,000 Lux) instead of the sun. The lamp should be positioned 1-2 arms lengths away from you. They lamps are sold at Home Medical Companies such as [employer] or [employer]. A prescription can be written to get insurance coverage in some cases. They are also sold on [employer].    Using the light and melatonin should help march your internal clock (known as your circadian rhythms) gradually earlier. As your bedtime advances, remember to take your melatonin earlier, keeping it 5 hours before your fall asleep time.    Avoid naps and sleeping in because sleeping during the day will delay your body's clock and you  will have to start from scratch.     More information about light therapy:  If the cost of any light box is too much, you can also purchase a compact fluorescent all spectrum light bulb at a local hardware store for about $8.  The most commonly available bulb is 1400 lumen.  You would need two of these positioned within 1 meter of yourself to be equivalent to 2,500 lux.  The bulbs can be placed in a standard light fixture.  Additionally, they can be placed in a mountable fixture that is used in greenhouses.  Mountable fixtures are also available at hardware stores for about $9.  Do not look directly at the light.  If you have any concerns regarding the safety of bright light therapy for you, it is recommended that you consult an ophthalmologist before using a light box.  If you have a condition that makes your eyes very sensitive to light, macular degeneration, a family history of such problems, or diabetic changes to your eyes, consult an ophthalmologist before using a light box. If you have anxiety disorder and have an increase in anxiety discontinue use.

## 2017-03-20 DIAGNOSIS — F98.8 ADD (ATTENTION DEFICIT DISORDER): ICD-10-CM

## 2017-03-20 DIAGNOSIS — F41.1 GAD (GENERALIZED ANXIETY DISORDER): ICD-10-CM

## 2017-03-20 DIAGNOSIS — F33.1 MODERATE EPISODE OF RECURRENT MAJOR DEPRESSIVE DISORDER (H): ICD-10-CM

## 2017-03-20 NOTE — TELEPHONE ENCOUNTER
Reason for Call:  Other call back    Detailed comments: patient was referred to [facility]: Psychiatry Clinic [facility] [phone]   [email or web address]  By Dr. Ballesteros.  Patient cannot remember the specific doctor that Dr. Ballesteros told him to see.  Wondering if we could call him with the doctor's name.      MENTAL HEALTH REFERRAL (Order # [id number]) on 02/07/2017      Phone Number Patient can be reached at: Home number on file [phone] (home)    Best Time: any time    Can we leave a detailed message on this number? YES    Call taken on 3/20/2017 at 12:40 PM by Ani Piña  .

## 2017-04-10 ENCOUNTER — THERAPY VISIT (OUTPATIENT)
Dept: SLEEP MEDICINE | Facility: CLINIC | Age: 27
End: 2017-04-10
Payer: COMMERCIAL

## 2017-04-10 DIAGNOSIS — R40.0 SLEEPINESS: ICD-10-CM

## 2017-04-10 DIAGNOSIS — R06.83 SNORING: ICD-10-CM

## 2017-04-10 PROCEDURE — 95810 POLYSOM 6/> YRS 4/> PARAM: CPT | Performed by: PSYCHIATRY & NEUROLOGY

## 2017-04-10 NOTE — MR AVS SNAPSHOT
"              After Visit Summary   4/10/2017    Kristen Hernandes    MRN: [id number]           Patient Information     Date Of Birth          1990        Visit Information        Provider Department      4/10/2017 8:30 PM BED 3 SH SLEEP [facility]        Today's Diagnoses     Snoring        Sleepiness          Care Instructions    Diagnostic PSG completed per provider order.  Patient did not meet criteria for PAP therapy.        Follow-ups after your visit        Your next 10 appointments already scheduled     Apr 24, 2017  1:30 PM CDT   Return Sleep Patient with Bennett Ezra Goltz, PA-C   [facility] ([facility])    [address] [id number] [phone]              Who to contact     If you have questions or need follow up information about today's clinic visit or your schedule please contact [facility] directly at [phone].  Normal or non-critical lab and imaging results will be communicated to you by [employer], letter or phone within 4 business days after the clinic has received the results. If you do not hear from us within 7 days, please contact the clinic through [employer] or phone. If you have a critical or abnormal lab result, we will notify you by phone as soon as possible.  Submit refill requests through [employer] or call your pharmacy and they will forward the refill request to us. Please allow 3 business days for your refill to be completed.          Additional Information About Your Visit        MyChart Information     [employer] lets you send messages to your doctor, view your test results, renew your prescriptions, schedule appointments and more. To sign up, go to [email or web address] . Click on \"Log in\" on the left side of the screen, which will take you to the Welcome page. Then click on \"Sign up Now\" on the right side of the page.     You will be asked to enter the access code listed " below, as well as some personal information. Please follow the directions to create your username and password.     Your access code is: [email or web address]  Expires: 4/15/2017  5:18 AM     Your access code will  in 90 days. If you need help or a new code, please call your [place] clinic or [phone].        Care EveryWhere ID     This is your Care EveryWhere ID. This could be used by other organizations to access your [place] medical records  [id number]         Blood Pressure from Last 3 Encounters:   17 117/71   17 145/70   17 110/69    Weight from Last 3 Encounters:   17 84.5 kg (186 lb 3.2 oz)   17 83.9 kg (185 lb)   17 84.2 kg (185 lb 9.6 oz)              We Performed the Following     Comprehensive Sleep Study        Primary Care Provider Office Phone # Fax #    Alejandro Ashu Wells [phone]       [facility] [address]  [facility] [address]        Thank you!     Thank you for choosing [facility]  for your care. Our goal is always to provide you with excellent care. Hearing back from our patients is one way we can continue to improve our services. Please take a few minutes to complete the written survey that you may receive in the mail after your visit with us. Thank you!             Your Updated Medication List - Protect others around you: Learn how to safely use, store and throw away your medicines at www.disposemymeds.org.          This list is accurate as of: 4/10/17 11:59 PM.  Always use your most recent med list.                   Brand Name Dispense Instructions for use    DULoxetine 60 MG EC capsule    CYMBALTA    30 capsule    Take 1 capsule (60 mg) by mouth daily       lisdexamfetamine 30 MG capsule    VYVANSE    30 capsule    Take 1 capsule (30 mg) by mouth daily       loratadine 10 MG tablet    CLARITIN     Take 10 mg by mouth daily

## 2017-04-10 NOTE — TELEPHONE ENCOUNTER
lisdexamfetamine (VYVANSE) 30 MG capsule 30 capsule 0 2/7/2017           Last Written Prescription Date: 2/7/17  Last Fill Quantity: 30,  # refills: 0   [facility] Office Visit with [facility] or [facility] prescribing provider: 2/7/17                                             DULoxetine (CYMBALTA) 60 MG EC capsule 30 capsule 1 2/7/2017         Last Written Prescription Date: 30  Last Fill Quantity: 1, # refills: 2/7/17  Last Office Visit with [facility], [facility] or [facility] prescribing provider: 2/7/17        BP Readings from Last 3 Encounters:   03/16/17 117/71   03/05/17 145/70   02/25/17 110/69     Pulse: (for Fetzima)  Creatinine   Date Value Ref Range Status   02/25/2017 0.88 0.66 - 1.25 mg/dL Final   ]    Last PHQ-9 score on record=   PHQ-9 SCORE 1/5/2017   Total Score 18

## 2017-04-10 NOTE — TELEPHONE ENCOUNTER
Provider, please review initial phone calls coming in from patient.  Please advise on Psychiatrist you recommend for patient and refill medication if ok.  Charla Kapadia RN

## 2017-04-10 NOTE — TELEPHONE ENCOUNTER
Reason for call: Other   Patient called regarding (reason for call): Pt called on 3/20 and asked which doctor Dr. Wells recommended he see for the [facility] Psychiatry referral, he has not heard back. Pt also is requesting renewals of his prescriptions that do not have refills on them, one of which he has one pill left of.   Meds are lisdexamfetamine (VYVANSE) 30 MG capsule and DULoxetine (CYMBALTA) 60 MG EC capsule  Additional comments: [email or web address] [address] [place] [address] [address] [place] [address]. Pt is aware Dr. Wells is not in today. Please advise.     Phone Number Pt can be reached at: Home number on file [phone] (home)  Best Time: Today, if possible.   Can we leave a detailed message on this number? YES

## 2017-04-11 RX ORDER — DULOXETIN HYDROCHLORIDE 60 MG/1
60 CAPSULE, DELAYED RELEASE ORAL DAILY
Qty: 30 CAPSULE | Refills: 1 | Status: SHIPPED | OUTPATIENT
Start: 2017-04-11 | End: 2017-06-06

## 2017-04-11 RX ORDER — LISDEXAMFETAMINE DIMESYLATE 30 MG/1
30 CAPSULE ORAL DAILY
Qty: 30 CAPSULE | Refills: 0 | Status: SHIPPED | OUTPATIENT
Start: 2017-04-11 | End: 2017-05-18

## 2017-04-11 NOTE — PROCEDURES
" SLEEP STUDY INTERPRETATION  POLYSOMNOGRAPHY REPORT      Patient: Kristen Hernandes  YOB: 1990  Study Date: 4/10/2017  MRN: [id number]  Referring Provider: MD Wells Eusebio  Ordering Provider: Goltz, PA-C, Bennett    Indications for Polysomnography: The patient is a 26 y old Male who is 5' 11\" and weighs 186.0 lbs.  His BMI is 26.0, [place] sleepiness scale 7.0 and neck size is 39.0.  Relevant medical history includes snoring, non restorative sleep. A diagnostic polysomnogram was performed to evaluate for sleep apnea.    Polysomnogram Data:  A full night polysomnogram recorded the standard physiologic parameters including EEG, EOG, EMG, ECG, nasal and oral airflow.  Respiratory parameters of chest and abdominal movements were recorded with respiratory inductance plethysmography.  Oxygen saturation was recorded by pulse oximetry.      Sleep Architecture: Sleep fragmentation  The total recording time of the polysomnogram was 490.1 minutes.  The total sleep time was 408.5 minutes.  Sleep latency was 42.8 minutes.  REM latency was 184.0 minutes.  Arousal index was 18.7 arousals per hour.  Sleep efficiency was 83.4%.  Wake after sleep onset was 38.0 minutes.  The patient spent 3.8% of total sleep time in Stage N1, 58.0% in Stage N2, 25.1% in Stages N3, and 13.1% in REM.  Time in REM supine was 53.5 minutes.    Respiration: The patient did not demonstrate clinically significant sleep disordered breathing.  Of note, this was a good study that included supine REM.      Events - The polysomnogram revealed a presence of 2 obstructive, 3 central, and - mixed apneas resulting in an apnea index of 0.7 events per hour.  There were 10 hypopneas resulting in a hypopnea index of 1.5 events per hour.  The combined apnea/hypopnea index was 2.2 events per hour.  The REM AHI was 12.3 events per hour.  The supine AHI was 2.7 events per hour.  The RERA index was 2.9 events per hour.   The RDI was 5.1 events per " hour.    Snoring - was reported as moderate-loud.    Respiratory rate and pattern - was notable for normal respiratory rate and pattern.    Sustained Sleep Associated Hypoventilation - Transcutaneous carbon dioxide monitoring was not used.    Sleep Associated Hypoxemia - (Greater than 5 minutes O2 sat below 89%) was not present.  Baseline oxygen saturation was 97.0%. Lowest oxygen saturation was 90.4%.  Time spent less than or equal to 88% was 0 minutes.  Time spent less than or equal to 89% was 0 minutes.  5.1 2.9 2.2     Movement Activity:     Periodic Limb Activity - There were 13 PLMs during the entire study. The PLM index was 1.9 movements per hour.  The PLM Arousal Index was 0 per hour.    REM EMG Activity - Excessive muscle activity was not present.    Nocturnal Behavior - Abnormal sleep related behaviors were not noted.    Bruxism - None apparent.    Cardiac Summary: Sinus  The average pulse rate was 55.8 bpm.  The minimum pulse rate was 45.6 bpm while the maximum pulse rate was 86.0 bpm.     Assessment:     The patient did not demonstrate clinically significant sleep disordered breathing.  Of note, this was a good study that included supine REM.      Recommendations:    Patient may be reassured that they do not have clinically significant sleep disordered breathing.    If the patient is interested in treating snoring options include dental appliance or upper airway surgery.      Recommend the patient optimize the duration and circadian timing of sleep.     Advise regarding the risks of drowsy driving.    Suggest optimizing sleep schedule and avoiding sleep deprivation.    Diagnostic Code(s): G47.9      Tong Gann MD 4-11-17

## 2017-04-11 NOTE — TELEPHONE ENCOUNTER
Refills granted.  For the Psychiatrist referral, patient will need to contact the numbers printed on his AVS.

## 2017-04-24 ENCOUNTER — OFFICE VISIT (OUTPATIENT)
Dept: SLEEP MEDICINE | Facility: CLINIC | Age: 27
End: 2017-04-24
Payer: COMMERCIAL

## 2017-04-24 VITALS
HEIGHT: 71 IN | WEIGHT: 187 LBS | HEART RATE: 68 BPM | SYSTOLIC BLOOD PRESSURE: 122 MMHG | OXYGEN SATURATION: 97 % | BODY MASS INDEX: 26.18 KG/M2 | DIASTOLIC BLOOD PRESSURE: 70 MMHG

## 2017-04-24 DIAGNOSIS — R06.83 SNORING: ICD-10-CM

## 2017-04-24 DIAGNOSIS — G47.21 DELAYED SLEEP PHASE SYNDROME: Primary | ICD-10-CM

## 2017-04-24 PROCEDURE — 99214 OFFICE O/P EST MOD 30 MIN: CPT | Performed by: PHYSICIAN ASSISTANT

## 2017-04-24 ASSESSMENT — PAIN SCALES - GENERAL: PAINLEVEL: NO PAIN (0)

## 2017-04-24 NOTE — PROGRESS NOTES
Sleep Study Follow-Up Visit:    Date on this visit: 4/24/2017    Kristen Hernandes comes in today for follow-up of his sleep study done on 4/10/2017 at the [facility] for feeling tired upon awakening, and being told he is a restless sleeper for 5 years. His medical history is significant for depression, anxiety, ADD and several ED presentations for issues regarding alcohol use.    Sleep latency 42.8 minutes without Ambien.  REM achieved.   REM latency 184 minutes.  Sleep efficiency 83.4%. Total sleep time 408.5 minutes.    Sleep architecture:  Stage 1, 3.8% (5%), stage 2, 58% (45-55%), stage 3, 25.1% (15-20%), stage REM, 13.1% (20-25%).  AHI was 2.2/hr, with desaturations down to 90%. He spent 0 minutes below 90% SpO2. RDI 5.1/hr.  REM RDI 21.3/hr, consistent with moderate REM JAYY.  Supine RDI 5.7/hr, consistent with mild SUPINE JAYY. His non-supine RDI was 2.4/hr.  Periodic Limb Movement Index 1.9/hour.       CPAP titration: CPAP was not indicated. These findings were reviewed with the patient.  He has been going to bed around 11 PM. He falls asleep in about 30 minutes. He is up at 7:30 AM. It is a little difficult to get up at that time. On weekends, his sleep schedule is pretty much the same. This is a change in his sleep schedule compared to his last visit. He has a lot of projects going on right now. He has some difficulty gauging if his energy is better. He worries about homework and student debt. He denies inadvertent dozing or purposeful naps. He is too busy and not good at napping because his mind races.     Past medical/surgical history, family history, social history, medications and allergies were reviewed.      Problem List:  Patient Active Problem List    Diagnosis Date Noted     ADD (attention deficit disorder) 01/05/2017     Priority: Medium     ROSANA (generalized anxiety disorder) 01/05/2017     Priority: Medium     Moderate episode of recurrent major depressive disorder (H) 01/05/2017      Priority: Medium     Psychophysiological insomnia 01/05/2017     Priority: Medium        Impression/Plan:    (G47.21) Delayed sleep phase syndrome  (primary encounter diagnosis)  Comment: He has changed his schedule and seems to be getting to sleep more quickly. He has a hard time judging if his energy is better. He does not nap, likely because of anxiety.  Plan: Continue to keep a consistent sleep schedule. We reviewed instructions for controlling light exposure and using melatonin. He was encouraged to continue working with his mental health providers regarding anxiety and depression.    (R06.83) Snoring  Comment: No significant apnea was observed.   Plan: No treatment is necessary.      He will follow up with me in the future as needed.     Twenty-five minutes spent with patient, all of which were spent face-to-face counseling, consulting, coordinating plan of care.      Bennett Goltz, PA-C    CC: Bennett Ezra Goltz

## 2017-04-24 NOTE — NURSING NOTE
"Chief Complaint   Patient presents with     Sleep Problem     Follow up psg       Initial /70  Pulse 68  Ht 1.803 m (5' 11\")  Wt 84.8 kg (187 lb)  SpO2 97%  BMI 26.08 kg/m2 Estimated body mass index is 26.08 kg/(m^2) as calculated from the following:    Height as of this encounter: 1.803 m (5' 11\").    Weight as of this encounter: 84.8 kg (187 lb).  Medication Reconciliation: complete   ESS 7/24  Ani Cano MA      "

## 2017-04-24 NOTE — MR AVS SNAPSHOT
After Visit Summary   4/24/2017    Kristen Hernandes    MRN: [id number]           Patient Information     Date Of Birth          1990        Visit Information        Provider Department      4/24/2017 1:30 PM Goltz, Bennett Ezra, PA-C [facility]        Today's Diagnoses     Delayed sleep phase syndrome    -  1    Snoring          Care Instructions      Your BMI is Body mass index is 26.08 kg/(m^2).  Weight management is a personal decision.  If you are interested in exploring weight loss strategies, the following discussion covers the approaches that may be successful. Body mass index (BMI) is one way to tell whether you are at a healthy weight, overweight, or obese. It measures your weight in relation to your height.  A BMI of 18.5 to 24.9 is in the healthy range. A person with a BMI of 25 to 29.9 is considered overweight, and someone with a BMI of 30 or greater is considered obese. More than two-thirds of American adults are considered overweight or obese.  Being overweight or obese increases the risk for further weight gain. Excess weight may lead to heart disease and diabetes.  Creating and following plans for healthy eating and physical activity may help you improve your health.  Weight control is part of healthy lifestyle and includes exercise, emotional health, and healthy eating habits. Careful eating habits lifelong are the mainstay of weight control. Though there are significant health benefits from weight loss, long-term weight loss with diet alone may be very difficult to achieve- studies show long-term success with dietary management in less than 10% of people. Attaining a healthy weight may be especially difficult to achieve in those with severe obesity. In some cases, medications, devices and surgical management might be considered.  What can you do?  If you are overweight or obese and are interested in methods for weight loss, you should discuss this with your  provider.     Consider reducing daily calorie intake by 500 calories.     Keep a food journal.     Avoiding skipping meals, consider cutting portions instead.    Diet combined with exercise helps maintain muscle while optimizing fat loss. Strength training is particularly important for building and maintaining muscle mass. Exercise helps reduce stress, increase energy, and improves fitness. Increasing exercise without diet control, however, may not burn enough calories to loose weight.       Start walking three days a week 10-20 minutes at a time    Work towards walking thirty minutes five days a week     Eventually, increase the speed of your walking for 1-2 minutes at time    In addition, we recommend that you review healthy lifestyles and methods for weight loss available through the National Institutes of Health patient information sites:  http://win.niddk.nih.gov/publications/index.htm    And look into health and wellness programs that may be available through your health insurance provider, employer, local community center, or chava club.    Weight management plan: Patient was referred to their PCP to discuss a diet and exercise plan.            Follow-ups after your visit        Follow-up notes from your care team     Return if symptoms worsen or fail to improve.      Who to contact     If you have questions or need follow up information about today's clinic visit or your schedule please contact [facility] directly at [phone].  Normal or non-critical lab and imaging results will be communicated to you by MyChart, letter or phone within 4 business days after the clinic has received the results. If you do not hear from us within 7 days, please contact the clinic through MyChart or phone. If you have a critical or abnormal lab result, we will notify you by phone as soon as possible.  Submit refill requests through RingCaptcha or call your pharmacy and they will forward the refill request to  "us. Please allow 3 business days for your refill to be completed.          Additional Information About Your Visit        MyChart Information     [email or web address] lets you send messages to your doctor, view your test results, renew your prescriptions, schedule appointments and more. To sign up, go to [email or web address] . Click on \"Log in\" on the left side of the screen, which will take you to the Welcome page. Then click on \"Sign up Now\" on the right side of the page.     You will be asked to enter the access code listed below, as well as some personal information. Please follow the directions to create your username and password.     Your access code is: [email or web address]  Expires: 2017  2:14 PM     Your access code will  in 90 days. If you need help or a new code, please call your [place] clinic or [phone].        Care EveryWhere ID     This is your Care EveryWhere ID. This could be used by other organizations to access your [place] medical records  [id number]        Your Vitals Were     Pulse Height Pulse Oximetry BMI (Body Mass Index)          68 1.803 m (5' 11\") 97% 26.08 kg/m2         Blood Pressure from Last 3 Encounters:   17 122/70   17 117/71   17 145/70    Weight from Last 3 Encounters:   17 84.8 kg (187 lb)   17 84.5 kg (186 lb 3.2 oz)   17 83.9 kg (185 lb)              Today, you had the following     No orders found for display       Primary Care Provider Office Phone # Fax #    Alejandro Ashu Wells [phone]       [facility] [address]  [facility] [address]        Thank you!     Thank you for choosing [facility]  for your care. Our goal is always to provide you with excellent care. Hearing back from our patients is one way we can continue to improve our services. Please take a few minutes to complete the written survey that you may receive in the mail after your visit with us. Thank " you!             Your Updated Medication List - Protect others around you: Learn how to safely use, store and throw away your medicines at www.disposemymeds.org.          This list is accurate as of: 4/24/17  2:14 PM.  Always use your most recent med list.                   Brand Name Dispense Instructions for use    DULoxetine 60 MG EC capsule    CYMBALTA    30 capsule    Take 1 capsule (60 mg) by mouth daily       lisdexamfetamine 30 MG capsule    VYVANSE    30 capsule    Take 1 capsule (30 mg) by mouth daily       loratadine 10 MG tablet    CLARITIN     Take 10 mg by mouth daily

## 2017-04-24 NOTE — PATIENT INSTRUCTIONS

## 2017-05-07 ENCOUNTER — HOSPITAL ENCOUNTER (EMERGENCY)
Facility: CLINIC | Age: 27
Discharge: HOME | End: 2017-05-07
Attending: EMERGENCY MEDICINE | Admitting: EMERGENCY MEDICINE

## 2017-05-07 VITALS
DIASTOLIC BLOOD PRESSURE: 78 MMHG | BODY MASS INDEX: 25.2 KG/M2 | SYSTOLIC BLOOD PRESSURE: 133 MMHG | WEIGHT: 180 LBS | RESPIRATION RATE: 14 BRPM | OXYGEN SATURATION: 98 % | TEMPERATURE: 97.6 F | HEIGHT: 71 IN

## 2017-05-07 DIAGNOSIS — E86.0 DEHYDRATION: ICD-10-CM

## 2017-05-07 DIAGNOSIS — Z78.9 ALCOHOL USE: ICD-10-CM

## 2017-05-07 DIAGNOSIS — R42 DIZZINESS: ICD-10-CM

## 2017-05-07 LAB
ANION GAP SERPL CALCULATED.3IONS-SCNC: 10 MMOL/L (ref 3–14)
BUN SERPL-MCNC: 15 MG/DL (ref 7–30)
CALCIUM SERPL-MCNC: 8.2 MG/DL (ref 8.5–10.1)
CHLORIDE SERPL-SCNC: 103 MMOL/L (ref 94–109)
CO2 SERPL-SCNC: 24 MMOL/L (ref 20–32)
CREAT SERPL-MCNC: 0.79 MG/DL (ref 0.66–1.25)
ETHANOL SERPL-MCNC: 0.05 G/DL
GFR SERPL CREATININE-BSD FRML MDRD: ABNORMAL ML/MIN/1.7M2
GLUCOSE BLDC GLUCOMTR-MCNC: 108 MG/DL (ref 70–99)
GLUCOSE SERPL-MCNC: 101 MG/DL (ref 70–99)
INTERPRETATION ECG - MUSE: NORMAL
POTASSIUM SERPL-SCNC: 3.7 MMOL/L (ref 3.4–5.3)
SODIUM SERPL-SCNC: 137 MMOL/L (ref 133–144)

## 2017-05-07 PROCEDURE — 99284 EMERGENCY DEPT VISIT MOD MDM: CPT

## 2017-05-07 PROCEDURE — 00000146 ZZHCL STATISTIC GLUCOSE BY METER IP

## 2017-05-07 PROCEDURE — 93005 ELECTROCARDIOGRAM TRACING: CPT

## 2017-05-07 PROCEDURE — 80048 BASIC METABOLIC PNL TOTAL CA: CPT | Performed by: EMERGENCY MEDICINE

## 2017-05-07 PROCEDURE — 80320 DRUG SCREEN QUANTALCOHOLS: CPT | Performed by: EMERGENCY MEDICINE

## 2017-05-07 ASSESSMENT — ENCOUNTER SYMPTOMS
SHORTNESS OF BREATH: 0
PALPITATIONS: 0
DIZZINESS: 1
VOMITING: 0
NAUSEA: 1

## 2017-05-07 NOTE — ED AVS SNAPSHOT
Emergency Department    [address] [facility] [address]    Phone:  [phone]    Fax:  [phone]                                       Kristen Hernandes   MRN: [id number]    Department:   Emergency Department   Date of Visit:  5/7/2017           After Visit Summary Signature Page     I have received my discharge instructions, and my questions have been answered. I have discussed any challenges I see with this plan with the nurse or doctor.    ..........................................................................................................................................  Patient/Patient Representative Signature      ..........................................................................................................................................  Patient Representative Print Name and Relationship to Patient    ..................................................               ................................................  Date                                            Time    ..........................................................................................................................................  Reviewed by Signature/Title    ...................................................              ..............................................  Date                                                            Time

## 2017-05-07 NOTE — ED NOTES
This writer ambulated patient down munoz and then to the bathroom. Patient ambulated without assistance, was steady and did not need to utilize munoz railing.

## 2017-05-07 NOTE — ED AVS SNAPSHOT
Emergency Department    [address] [facility] [address]    Phone:  [phone]    Fax:  [phone]                                       Kristen Hernandes   MRN: [id number]    Department:   Emergency Department   Date of Visit:  5/7/2017           Patient Information     Date Of Birth          1990        Your diagnoses for this visit were:     Dizziness     Alcohol use     Dehydration        You were seen by Brian Gunderson MD.      Follow-up Information     Call Alejandro Wells MD.    Specialty:  Internal Medicine    Why:  As needed    Contact information:    [facility]  [address] [facility] [address]  [facility] [address] [phone]          Follow up with  Emergency Department.    Specialty:  EMERGENCY MEDICINE    Why:  As needed, If symptoms worsen    Contact information:    [address] [facility] [address] [phone]        Discharge Instructions       There are no signs of any dangerous condition at this time.  I think your symptoms are likely due to a combination of alcohol use and mild dehydration and sun exposure.  No new medications are needed at this time.  I recommend that you follow up with your regular doctor later this week for recheck if not steadily improving.  Return here for sudden worsening at any hour.    24 Hour Appointment Hotline       To make an appointment at any [facility], call [phone] ([phone]). If you don't have a family doctor or clinic, we will help you find one. [facility] are conveniently located to serve the needs of you and your family.             Review of your medicines      Our records show that you are taking the medicines listed below. If these are incorrect, please call your family doctor or clinic.        Dose / Directions Last dose taken    DULoxetine 60 MG EC capsule   Commonly known as:  CYMBALTA   Dose:  60 mg   Quantity:  30 capsule        Take 1 capsule (60 mg)  by mouth daily   Refills:  1        lisdexamfetamine 30 MG capsule   Commonly known as:  VYVANSE   Dose:  30 mg   Quantity:  30 capsule        Take 1 capsule (30 mg) by mouth daily   Refills:  0        loratadine 10 MG tablet   Commonly known as:  CLARITIN   Dose:  10 mg        Take 10 mg by mouth daily   Refills:  0                Procedures and tests performed during your visit     Activity: Ambulate    Alcohol ethyl    Basic metabolic panel    EKG 12-lead, tracing only    Glucose by meter    Glucose monitor nursing POCT    Orthostatic blood pressure and pulse      Orders Needing Specimen Collection     None      Pending Results     No orders found from 5/5/2017 to 5/8/2017.            Pending Culture Results     No orders found from 5/5/2017 to 5/8/2017.            Pending Results Instructions     If you had any lab results that were not finalized at the time of your Discharge, you can call the ED Lab Result RN at [phone]. You will be contacted by this team for any positive Lab results or changes in treatment. The nurses are available 7 days a week from 10A to 6:30P.  You can leave a message 24 hours per day and they will return your call.        Test Results From Your Hospital Stay        5/7/2017 12:51 AM      Component Results     Component Value Ref Range & Units Status    Glucose 108 (H) 70 - 99 mg/dL Final         5/7/2017  1:51 AM      Component Results     Component Value Ref Range & Units Status    Sodium 137 133 - 144 mmol/L Final    Potassium 3.7 3.4 - 5.3 mmol/L Final    Chloride 103 94 - 109 mmol/L Final    Carbon Dioxide 24 20 - 32 mmol/L Final    Anion Gap 10 3 - 14 mmol/L Final    Glucose 101 (H) 70 - 99 mg/dL Final    Urea Nitrogen 15 7 - 30 mg/dL Final    Creatinine 0.79 0.66 - 1.25 mg/dL Final    GFR Estimate >90  Non  GFR Calc   >60 mL/min/1.7m2 Final    GFR Estimate If Black >90   GFR Calc   >60 mL/min/1.7m2 Final    Calcium 8.2 (L) 8.5 - 10.1 mg/dL Final          5/7/2017  1:51 AM      Component Results     Component Value Ref Range & Units Status    Ethanol g/dL 0.05 (H) <0.01 g/dL Final                Clinical Quality Measure: Blood Pressure Screening     Your blood pressure was checked while you were in the emergency department today. The last reading we obtained was  BP: 148/79 . Please read the guidelines below about what these numbers mean and what you should do about them.  If your systolic blood pressure (the top number) is less than 120 and your diastolic blood pressure (the bottom number) is less than 80, then your blood pressure is normal. There is nothing more that you need to do about it.  If your systolic blood pressure (the top number) is 120-139 or your diastolic blood pressure (the bottom number) is 80-89, your blood pressure may be higher than it should be. You should have your blood pressure rechecked within a year by a primary care provider.  If your systolic blood pressure (the top number) is 140 or greater or your diastolic blood pressure (the bottom number) is 90 or greater, you may have high blood pressure. High blood pressure is treatable, but if left untreated over time it can put you at risk for heart attack, stroke, or kidney failure. You should have your blood pressure rechecked by a primary care provider within the next 4 weeks.  If your provider in the emergency department today gave you specific instructions to follow-up with your doctor or provider even sooner than that, you should follow that instruction and not wait for up to 4 weeks for your follow-up visit.        Thank you for choosing [facility]       Thank you for choosing [facility] for your care. Our goal is always to provide you with excellent care. Hearing back from our patients is one way we can continue to improve our services. Please take a few minutes to complete the written survey that you may receive in the mail after you visit with us. Thank you!        En  "Information     [employer] lets you send messages to your doctor, view your test results, renew your prescriptions, schedule appointments and more. To sign up, go to [email or web address] . Click on \"Log in\" on the left side of the screen, which will take you to the Welcome page. Then click on \"Sign up Now\" on the right side of the page.     You will be asked to enter the access code listed below, as well as some personal information. Please follow the directions to create your username and password.     Your access code is: [email or web address]  Expires: 2017  2:14 PM     Your access code will  in 90 days. If you need help or a new code, please call your [place] clinic or [phone].        Care EveryWhere ID     This is your Care EveryWhere ID. This could be used by other organizations to access your [place] medical records  [id number]        After Visit Summary       This is your record. Keep this with you and show to your community pharmacist(s) and doctor(s) at your next visit.                  "

## 2017-05-07 NOTE — DISCHARGE INSTRUCTIONS
There are no signs of any dangerous condition at this time.  I think your symptoms are likely due to a combination of alcohol use and mild dehydration and sun exposure.  No new medications are needed at this time.  I recommend that you follow up with your regular doctor later this week for recheck if not steadily improving.  Return here for sudden worsening at any hour.

## 2017-05-07 NOTE — ED NOTES
PT walked down the munoz and back with an unsteady gait.  PT needed to stop for support every two to three steps. PT complained of dizziness throughout walk.

## 2017-05-07 NOTE — ED PROVIDER NOTES
History     Chief Complaint:  Dizziness     HPI   Kristen Hernandes is a 26 year old male with history of anxiety and alcohol use who presents to the emergency department today for evaluation of dizziness and nausea. Patient reports being outside in the sun all day and drinking beer, last had at 1730 today.  He feels lightheaded like he might pass out.  No new pains.  No injuries.  He had multiple beers and several [place] ice teas.  Symptoms began after returning home and resting alone in his apartment. He has also been feeling tingling sensations in his feet and hands that has not improved. Patient states that he drinks at least once a month, but had before been drinking once a weekend for some time before. Since onset of symptoms, he has been drinking lots of water without resolution of symptoms, prompting his visit, and expresses concern that he might pass out at home alone. He also notes that his friends did not have similar symptoms, which was concerning.  He states this is the first time he has been out in the sun in a long time, as he typically spends 12+ hours a day at his computer.  Patient reports no vomiting, loss of consciousness, or recent illness. No other concerns were voiced at this time.  No focal weakness in arms or legs.    EPIC chart review shows multiple prior similar presentations in recent months.  Labs have typically shown low-moderate alcohol levels and benign electrolytes.    Allergies:  Seasonal allergies    Medications:    Cymbalta  Vyvanze  Claritin     Past Medical History:    ADD  Anxiety   Depressive disorder    Past Surgical History:    Tonsillectomy  Rhinoplasty     Family History:    Mother - diabetes, depression, anxiety     Social History:  Arrived to ED by Uber.  Alcohol Use: Positive    Review of Systems   Respiratory: Negative for shortness of breath.    Cardiovascular: Negative for chest pain and palpitations.   Gastrointestinal: Positive for nausea. Negative for vomiting.  "  Neurological: Positive for dizziness.   All other systems reviewed and are negative.    Physical Exam   First Vitals:  BP: 133/76  Heart Rate: 126  --> 84  Temp: 97.6  F (36.4  C)  Resp: 18  Height: 180.3 cm (5' 11\")  Weight: 81.6 kg (180 lb)  SpO2: 100 %  RA    Physical Exam  General: nontoxic appearing male sitting upright  HENT: mucous membranes slightly dry, OP clear, face nontender, TMs wnl bilaterally  CV: regular rate (tachycardia has resolved), regular rhythm, no murmur audible, normal radial pulses, no LE edema, cap refill normal in extremities  Resp: clear throughout, normal effort  GI: abdomen soft and nontender, no guarding  MSK: no bony tenderness  Skin: appropriately warm and dry  Neuro: awake, alert, clear speech, fully oriented, face symmetric, CN 2-12 intact,  normal, strength and sensation intact in all extr, no meningismus, ambulatory without ataxia, NIH SS = 0      Psych: anxious    Emergency Department Course     ECG:   ECG taken at 0016, ECG read at 0121  Sinus tachycardia  Otherwise normal ECG  Rate 103 bpm. ND interval 140. QRS duration 80. QT/QTc 356/466. P-R-T axes 50 47 49.    Laboratory:   BMP: Glucose: 101(H), Calcium: 8.2(L), Creatinine 0.79  Alcohol ethyl: 0.05(H)  Glucose: 108(H)    Emergency Department Course:  Nursing notes and vitals reviewed.  I performed an exam of the patient as documented above.    I performed electronic chart review in [employer].    At 0032 the patient was evaluated and we discussed plan of care. Symptomatic care was discussed for home.    The patient passed a PO challenge prior to discharge from the ED.    At 0112 the patient was rechecked and updated. Nursing notes that patient was able to ambulate without assistance. He insists on blood tests after ambulating.  I agreed to order basic labs.    At 0154 the patient was updated on lab results. We discussed plan of care and appropriate follow up.  He is now comfortable with plan for discharge home.    I " personally reviewed the treatment plan with the Patient and answered all related questions prior to discharge.    Impression & Plan      Medical Decision Making:  Kristen Hernandes is a 26 year old male with a history of anxiety and multiple prior visits for feelings of dizziness in the setting of alcohol use who presents today with concern for dizziness after spending a lot of time out in the sun today and significant alcohol use. His presenting tachycardia resolved.  His exam is benign. He has no focal findings to suggest a CNS lesion and I do not think that he requires neuro imaging.  Patient demonstrates significant anxiety which I think is a key contributor to his symptoms, in addition to probable contribution from alcohol use (not currently clinically intoxicated) and sun exposure.  He is not hyperthermic, and I do not think he has suffered heat stroke.  He inquired about IV fluids though I did not feel that this was indicated given that he as not throwing up and that he was tolerating oral intake both in pre hospital setting and here in the ED. He is not orthostatic when checked by ERT.  He has passed a road test. He was quite insistent of checking basic lab studies, and this was ultimately performed with reassuring results. He has had a number of presentations under similar circumstances and I think that acutely serious conditions have been rule out to a sufficient degree that he can be discharged to home.  He ultimately agreed with this plan and was discharged with a completely steady gait to take an Uber back home.     Diagnosis:    ICD-10-CM    1. Dizziness R42 Basic metabolic panel     Alcohol ethyl   2. Alcohol use Z78.9    3. Dehydration E86.0        Disposition:   Discharged to home. Plan for follow up with Alejandro Wellsibjames Disclosure:  All HORVATH, am serving as a scribe at 12:24 AM on 5/7/2017 to document services personally performed by Brian Gunderson, *, based on  my observations and the provider's statements to me.   EMERGENCY DEPARTMENT       Brian Gunderson MD  05/07/17 0546

## 2017-05-18 DIAGNOSIS — F98.8 ADD (ATTENTION DEFICIT DISORDER): ICD-10-CM

## 2017-05-18 RX ORDER — LISDEXAMFETAMINE DIMESYLATE 30 MG/1
30 CAPSULE ORAL DAILY
Qty: 30 CAPSULE | Refills: 0 | Status: SHIPPED | OUTPATIENT
Start: 2017-05-18 | End: 2017-06-06

## 2017-05-18 NOTE — TELEPHONE ENCOUNTER
Vyvanse      Last Written Prescription Date: 04/11/17  Last Fill Quantity: 30,  # refills: 0   Last Office Visit with [facility], [facility] or [facility] prescribing provider: 02/07/17    Kiara Conley MA

## 2017-05-18 NOTE — TELEPHONE ENCOUNTER
Reason for Call:  Medication or medication refill:    Do you use a [facility]?  Name of the pharmacy and phone number for the current request:  [facility] [address] [place], [place] - [address]      Name of the medication requested: lisdexamfetamine      Other request: pt needs rx refill for lisdexamfetamine to  [facility]. Please advise    Can we leave a detailed message on this number? YES    Phone number patient can be reached at: Home number on file [phone] (home)    Best Time: any    Call taken on 5/18/2017 at 2:22 PM by Sadiq Broussard

## 2017-06-02 ENCOUNTER — OFFICE VISIT (OUTPATIENT)
Dept: FAMILY MEDICINE | Facility: CLINIC | Age: 27
End: 2017-06-02

## 2017-06-02 VITALS
HEIGHT: 71 IN | DIASTOLIC BLOOD PRESSURE: 80 MMHG | TEMPERATURE: 98.8 F | WEIGHT: 177.19 LBS | OXYGEN SATURATION: 100 % | BODY MASS INDEX: 24.81 KG/M2 | HEART RATE: 97 BPM | SYSTOLIC BLOOD PRESSURE: 120 MMHG

## 2017-06-02 DIAGNOSIS — J01.01 ACUTE RECURRENT MAXILLARY SINUSITIS: Primary | ICD-10-CM

## 2017-06-02 DIAGNOSIS — F33.1 MODERATE EPISODE OF RECURRENT MAJOR DEPRESSIVE DISORDER (H): ICD-10-CM

## 2017-06-02 DIAGNOSIS — J30.1 SEASONAL ALLERGIC RHINITIS DUE TO POLLEN: ICD-10-CM

## 2017-06-02 PROCEDURE — 99213 OFFICE O/P EST LOW 20 MIN: CPT | Performed by: PHYSICIAN ASSISTANT

## 2017-06-02 RX ORDER — AMOXICILLIN 875 MG
875 TABLET ORAL 2 TIMES DAILY
Qty: 20 TABLET | Refills: 0 | Status: SHIPPED | OUTPATIENT
Start: 2017-06-02 | End: 2018-04-03

## 2017-06-02 NOTE — MR AVS SNAPSHOT
"              After Visit Summary   2017    Kristen Hernandes    MRN: [id number]           Patient Information     Date Of Birth          1990        Visit Information        Provider Department      2017 3:40 PM Brian Anaya PA-C [facility]        Today's Diagnoses     Acute recurrent maxillary sinusitis    -  1    Moderate episode of recurrent major depressive disorder (H)        Seasonal allergic rhinitis due to pollen           Follow-ups after your visit        Who to contact     If you have questions or need follow up information about today's clinic visit or your schedule please contact [facility] directly at [phone].  Normal or non-critical lab and imaging results will be communicated to you by [employer], letter or phone within 4 business days after the clinic has received the results. If you do not hear from us within 7 days, please contact the clinic through [employer] or phone. If you have a critical or abnormal lab result, we will notify you by phone as soon as possible.  Submit refill requests through [employer] or call your pharmacy and they will forward the refill request to us. Please allow 3 business days for your refill to be completed.          Additional Information About Your Visit        MyChart Information     [employer] lets you send messages to your doctor, view your test results, renew your prescriptions, schedule appointments and more. To sign up, go to [email or web address] . Click on \"Log in\" on the left side of the screen, which will take you to the Welcome page. Then click on \"Sign up Now\" on the right side of the page.     You will be asked to enter the access code listed below, as well as some personal information. Please follow the directions to create your username and password.     Your access code is: [email or web address]  Expires: 2017  2:14 PM     Your access code will  in 90 days. If you need help or a new code, please call your " "[facility] or [phone].        Care EveryWhere ID     This is your Care EveryWhere ID. This could be used by other organizations to access your [place] medical records  [id number]        Your Vitals Were     Pulse Temperature Height Pulse Oximetry BMI (Body Mass Index)       97 98.8  F (37.1  C) (Oral) 5' 11\" (1.803 m) 100% 24.71 kg/m2        Blood Pressure from Last 3 Encounters:   06/02/17 120/80   05/07/17 133/78   04/24/17 122/70    Weight from Last 3 Encounters:   06/02/17 177 lb 3 oz (80.4 kg)   05/07/17 180 lb (81.6 kg)   04/24/17 187 lb (84.8 kg)              We Performed the Following     DEPRESSION ACTION PLAN (DAP)          Today's Medication Changes          These changes are accurate as of: 6/2/17  4:01 PM.  If you have any questions, ask your nurse or doctor.               Start taking these medicines.        Dose/Directions    amoxicillin 875 MG tablet   Commonly known as:  AMOXIL   Used for:  Acute recurrent maxillary sinusitis   Started by:  Brian Anaya PA-C        Dose:  875 mg   Take 1 tablet (875 mg) by mouth 2 times daily   Quantity:  20 tablet   Refills:  0            Where to get your medicines      These medications were sent to [facility] [address] [facility], [place] - [address] AT [address] [place] [address]  [address], [place] [address]    Hours:  24-hours Phone:  [phone]     amoxicillin 875 MG tablet                Primary Care Provider Office Phone # Fax #    Alejandro Ashu Wells [phone]       [facility] [address]  [place] [address]        Thank you!     Thank you for choosing [facility]  for your care. Our goal is always to provide you with excellent care. Hearing back from our patients is one way we can continue to improve our services. Please take a few minutes to complete the written survey that you may receive in the mail after your visit with us. Thank you!             Your " Updated Medication List - Protect others around you: Learn how to safely use, store and throw away your medicines at www.disposemymeds.org.          This list is accurate as of: 6/2/17  4:01 PM.  Always use your most recent med list.                   Brand Name Dispense Instructions for use    amoxicillin 875 MG tablet    AMOXIL    20 tablet    Take 1 tablet (875 mg) by mouth 2 times daily       DULoxetine 60 MG EC capsule    CYMBALTA    30 capsule    Take 1 capsule (60 mg) by mouth daily       lisdexamfetamine 30 MG capsule    VYVANSE    30 capsule    Take 1 capsule (30 mg) by mouth daily       loratadine 10 MG tablet    CLARITIN     Take 10 mg by mouth daily

## 2017-06-02 NOTE — PROGRESS NOTES
"    SUBJECTIVE:                                                    Kristen Hernandes is a 26 year old male who presents to clinic today for the following health issues:      RESPIRATORY SYMPTOMS      Duration: one week    Description  nasal congestion and cough    Severity: moderate    Accompanying signs and symptoms:  As mentioned above    History (predisposing factors):  none    Precipitating or alleviating factors: None    Therapies tried and outcome:  oral decongestant   All.ROBERT Henry          Problem list and histories reviewed & adjusted, as indicated.  Additional history: 27 y/o male with sinus related symptoms over the last week or so.  He has long hx of sinus issues.  He had surgery 4 years ago.  It did help at that time.  He used to follow with ENT, but it has been a while.      BP Readings from Last 3 Encounters:   06/02/17 120/80   05/07/17 133/78   04/24/17 122/70    Wt Readings from Last 3 Encounters:   06/02/17 177 lb 3 oz (80.4 kg)   05/07/17 180 lb (81.6 kg)   04/24/17 187 lb (84.8 kg)                    Reviewed and updated as needed this visit by clinical staff       Reviewed and updated as needed this visit by Provider         ROS:  Constitutional, HEENT, cardiovascular, pulmonary, gi and gu systems are negative, except as otherwise noted.    OBJECTIVE:                                                    /80 (BP Location: Left arm, Patient Position: Left side, Cuff Size: Adult Large)  Pulse 97  Temp 98.8  F (37.1  C) (Oral)  Ht 5' 11\" (1.803 m)  Wt 177 lb 3 oz (80.4 kg)  SpO2 100%  BMI 24.71 kg/m2  Body mass index is 24.71 kg/(m^2).  GENERAL: alert and no distress  EYES: Eyes grossly normal to inspection  HENT: b/l TM dullness with loss of light reflux nasal mucosa is erythematous and edematous   RESP: lungs clear to auscultation - no rales, rhonchi or wheezes  CV: regular rate and rhythm, normal S1 S2, no S3 or S4, no murmur, click or rub, no peripheral edema and peripheral pulses " strong  PSYCH: mentation appears normal, affect normal/bright    Diagnostic Test Results:  none      ASSESSMENT/PLAN:                                                            1. Acute recurrent maxillary sinusitis  Offered referral to ENT, but with upcoming move, he is going to see how he does out in [place] to determine further need.  - amoxicillin (AMOXIL) 875 MG tablet; Take 1 tablet (875 mg) by mouth 2 times daily  Dispense: 20 tablet; Refill: 0    2. Moderate episode of recurrent major depressive disorder (H)  Stable, on cymbalta.  - DEPRESSION ACTION PLAN (DAP)    3. Seasonal allergic rhinitis due to pollen        Follow up if symptoms persist or worsen     Brian Anaya PA-C  [facility]

## 2017-06-06 ENCOUNTER — TELEPHONE (OUTPATIENT)
Dept: FAMILY MEDICINE | Facility: CLINIC | Age: 27
End: 2017-06-06

## 2017-06-06 DIAGNOSIS — F33.1 MODERATE EPISODE OF RECURRENT MAJOR DEPRESSIVE DISORDER (H): ICD-10-CM

## 2017-06-06 DIAGNOSIS — F98.8 ADD (ATTENTION DEFICIT DISORDER): ICD-10-CM

## 2017-06-06 DIAGNOSIS — F41.1 GAD (GENERALIZED ANXIETY DISORDER): ICD-10-CM

## 2017-06-06 NOTE — TELEPHONE ENCOUNTER
Reason for Call:  Medication or medication refill:    Do you use a [facility]?  Name of the pharmacy and phone number for the current request:    [facility] [address] [place], [place] - [address]      Name of the medication requested: DULoxetine (CYMBALTA) 60 MG EC capsule  lisdexamfetamine (VYVANSE) 30 MG capsule    Other request: The patient will pickup the hard copy for both    Can we leave a detailed message on this number? YES    Phone number patient can be reached at: Home number on file [phone] (home)    Best Time: anytime    Call taken on 6/6/2017 at 12:12 PM by Hannah Elias

## 2017-06-06 NOTE — TELEPHONE ENCOUNTER
Cymbalta    Last Written Prescription Date: 04/11/17  Last Fill Quantity: 30, # refills: 1  Last Office Visit with [facility], [facility] or [facility] prescribing provider: 02/07/17   Next 5 appointments (look out 90 days)     Jun 20, 2017 11:30 AM CDT   [facility]    [address] [facility] [address] [phone]                   BP Readings from Last 3 Encounters:   06/02/17 120/80   05/07/17 133/78   04/24/17 122/70     Pulse: (for Fetzima)  Creatinine   Date Value Ref Range Status   05/07/2017 0.79 0.66 - 1.25 mg/dL Final   ]    Last PHQ-9 score on record=   PHQ-9 SCORE 1/5/2017   Total Score 18     Vyvanse      Last Written Prescription Date: 05/18/17  Last Fill Quantity: 30,  # refills: 0   Last Office Visit with [facility], [facility] or [facility] prescribing provider: 02/07/17                                         Next 5 appointments (look out 90 days)     Jun 20, 2017 11:30 AM CDT   [facility]   [facility]    [address] [facility] [address] [phone]                  Kiara Conley MA

## 2017-06-12 NOTE — TELEPHONE ENCOUNTER
Patient calling to follow up ... No contact has been made according to patient to advise on Rx ... P .[phone]

## 2017-06-14 RX ORDER — DULOXETIN HYDROCHLORIDE 60 MG/1
60 CAPSULE, DELAYED RELEASE ORAL DAILY
Qty: 30 CAPSULE | Refills: 0 | Status: SHIPPED | OUTPATIENT
Start: 2017-06-14 | End: 2017-07-14

## 2017-06-14 RX ORDER — LISDEXAMFETAMINE DIMESYLATE 30 MG/1
30 CAPSULE ORAL DAILY
Qty: 30 CAPSULE | Refills: 0 | Status: SHIPPED | OUTPATIENT
Start: 2017-06-14 | End: 2018-11-08

## 2017-07-14 ENCOUNTER — TELEPHONE (OUTPATIENT)
Dept: FAMILY MEDICINE | Facility: CLINIC | Age: 27
End: 2017-07-14

## 2017-07-14 DIAGNOSIS — F33.1 MODERATE EPISODE OF RECURRENT MAJOR DEPRESSIVE DISORDER (H): ICD-10-CM

## 2017-07-14 DIAGNOSIS — F41.1 GAD (GENERALIZED ANXIETY DISORDER): ICD-10-CM

## 2017-07-14 RX ORDER — DULOXETIN HYDROCHLORIDE 60 MG/1
60 CAPSULE, DELAYED RELEASE ORAL DAILY
Qty: 30 CAPSULE | Refills: 0 | Status: SHIPPED | OUTPATIENT
Start: 2017-07-14 | End: 2017-08-16

## 2017-07-14 NOTE — TELEPHONE ENCOUNTER
Please ask patient if he has seen the psychiatrist.  If yes, I will need the records from his psychiatrist. If no, he will need to follow up with me in a month before any further refills.

## 2017-07-14 NOTE — TELEPHONE ENCOUNTER
Reason for Call:  Medication or medication refill:    Do you use a [facility]?  Name of the pharmacy and phone number for the current request:    [address] [place] [address] [facility]    Name of the medication requested: DULoxetine (CYMBALTA) 60 MG EC capsule    Other request: Please refill and call the patient if there's any concerns  The patient is currently out of Medication and wants a refill until he can find a new PCP in [place]    Can we leave a detailed message on this number? YES    Phone number patient can be reached at: Home number on file [phone] (home)    Best Time: anytime    Call taken on 7/14/2017 at 12:59 PM by Hannah Elias

## 2017-07-14 NOTE — TELEPHONE ENCOUNTER
Cymbalta    Last Written Prescription Date: 06/14/17  Last Fill Quantity: 30, # refills: 0  Last Office Visit with [facility], [facility] or [facility] prescribing provider: 06/02/17        BP Readings from Last 3 Encounters:   06/02/17 120/80   05/07/17 133/78   04/24/17 122/70     Pulse: (for Fetzima)  Creatinine   Date Value Ref Range Status   05/07/2017 0.79 0.66 - 1.25 mg/dL Final   ]    Last PHQ-9 score on record=   PHQ-9 SCORE 1/5/2017   Total Score 18     Kiara Conley MA

## 2017-07-17 NOTE — TELEPHONE ENCOUNTER
Machine identified patient name in full. Left in detailed VM of message below asking patient to call us back and update us.  Jacqueline Forte- ELEONORA

## 2017-07-18 NOTE — TELEPHONE ENCOUNTER
Spoke with patient and he has recently moved to [place] and he is in the process of finding a psychiatrist, as of now they are all 3 weeks out- so he is hoping that you can assist him with a 30 day bridge so he can stay on medication and continue finding continuing medical care at new residence. Please review and advise. Thanks  Jacqueline Forte- [facility]

## 2017-07-18 NOTE — TELEPHONE ENCOUNTER
Patient can establish with PCP there in [place] relatively quickly and have them issue a bridge Rx.

## 2017-07-18 NOTE — TELEPHONE ENCOUNTER
Left in detailed VM of message below on patients machine per his verbal okay to do so. Told to call back if further questions/concerns.  Jacqueline Forte- CMA

## 2018-04-03 ENCOUNTER — OFFICE VISIT (OUTPATIENT)
Dept: URGENT CARE | Facility: URGENT CARE | Age: 28
End: 2018-04-03
Payer: COMMERCIAL

## 2018-04-03 VITALS
RESPIRATION RATE: 16 BRPM | HEIGHT: 71 IN | DIASTOLIC BLOOD PRESSURE: 78 MMHG | HEART RATE: 78 BPM | WEIGHT: 175 LBS | TEMPERATURE: 98.1 F | SYSTOLIC BLOOD PRESSURE: 114 MMHG | OXYGEN SATURATION: 98 % | BODY MASS INDEX: 24.5 KG/M2

## 2018-04-03 DIAGNOSIS — J11.1 INFLUENZA-LIKE ILLNESS: Primary | ICD-10-CM

## 2018-04-03 DIAGNOSIS — R50.9 FEVER AND CHILLS: ICD-10-CM

## 2018-04-03 LAB
FLUAV+FLUBV AG SPEC QL: NEGATIVE
FLUAV+FLUBV AG SPEC QL: NEGATIVE
SPECIMEN SOURCE: NORMAL

## 2018-04-03 PROCEDURE — 99213 OFFICE O/P EST LOW 20 MIN: CPT | Performed by: PHYSICIAN ASSISTANT

## 2018-04-03 PROCEDURE — 87804 INFLUENZA ASSAY W/OPTIC: CPT | Performed by: FAMILY MEDICINE

## 2018-04-03 RX ORDER — OSELTAMIVIR PHOSPHATE 75 MG/1
75 CAPSULE ORAL 2 TIMES DAILY
Qty: 10 CAPSULE | Refills: 0 | Status: SHIPPED | OUTPATIENT
Start: 2018-04-03 | End: 2019-11-20

## 2018-04-03 RX ORDER — PROPRANOLOL HYDROCHLORIDE 20 MG/1
20 TABLET ORAL 2 TIMES DAILY
Qty: 60 TABLET | Refills: 1 | COMMUNITY
Start: 2018-04-03

## 2018-04-03 RX ORDER — FLUOXETINE 40 MG/1
40 CAPSULE ORAL DAILY
Qty: 30 CAPSULE | Refills: 1 | COMMUNITY
Start: 2018-04-03

## 2018-04-03 ASSESSMENT — ENCOUNTER SYMPTOMS
FATIGUE: 1
NUMBNESS: 0
COUGH: 0
RHINORRHEA: 1
CHILLS: 1
SHORTNESS OF BREATH: 0
FEVER: 1
HEADACHES: 1
MYALGIAS: 1
WHEEZING: 0
SORE THROAT: 1

## 2018-04-03 NOTE — PROGRESS NOTES
SUBJECTIVE:   Kristen Hernnades is a 27 year old male presenting with a chief complaint of fever, chills, body aches and headache since last night.  Chief Complaint   Patient presents with     Sick     X LAST NIGHT, ST, congestion, runny nose, fever, HA, dizzy, sweats and chills, fatigue, took nothing       He is an established patient of [facility].    Onset of symptoms was 1 day(s) ago.  Course of illness is worsening.    Severity moderate  Current and Associated symptoms: chills, sweats, stuffy nose, sore throat, headache, body aches and fatigue  Treatment measures tried include Rest.  Predisposing factors include exposure to influenza. He also has a history of chronic sinusitis.  He did get a flu shot this year.        Review of Systems   Constitutional: Positive for chills, fatigue and fever.   HENT: Positive for congestion, rhinorrhea and sore throat.    Respiratory: Negative for cough, shortness of breath and wheezing.    Musculoskeletal: Positive for myalgias.   Neurological: Positive for headaches. Negative for numbness.       Past Medical History:   Diagnosis Date     ADD (attention deficit disorder) 1/5/2017     Anxiety      Depressive disorder      Moderate episode of recurrent major depressive disorder (H) 1/5/2017     Family History   Problem Relation Age of Onset     DIABETES Mother      Depression Mother      Anxiety Disorder Mother      Other Cancer Paternal Grandfather      Hypertension No family hx of      CEREBROVASCULAR DISEASE No family hx of      Current Outpatient Prescriptions   Medication Sig Dispense Refill     FLUoxetine (PROZAC) 40 MG capsule Take 1 capsule (40 mg) by mouth daily 30 capsule 1     propranolol (INDERAL) 20 MG tablet Take 1 tablet (20 mg) by mouth 2 times daily 60 tablet 1     oseltamivir (TAMIFLU) 75 MG capsule Take 1 capsule (75 mg) by mouth 2 times daily 10 capsule 0     lisdexamfetamine (VYVANSE) 30 MG capsule Take 1 capsule (30 mg) by mouth daily 30 capsule 0     Social  "History   Substance Use Topics     Smoking status: Light Tobacco Smoker     Types: Dip, chew, snus or snuff     Smokeless tobacco: Current User      Comment: on weekends when he drinks, quit 3/2017, occ snus when studying     Alcohol use 0.0 oz/week     0 Standard drinks or equivalent per week      Comment: drank all da today       OBJECTIVE  /78 (BP Location: Right arm, Patient Position: Chair, Cuff Size: Adult Large)  Pulse 78  Temp 98.1  F (36.7  C) (Oral)  Resp 16  Ht 5' 11\" (1.803 m)  Wt 175 lb (79.4 kg)  SpO2 98%  BMI 24.41 kg/m2    Physical Exam   Constitutional: He appears well-developed and well-nourished.   HENT:   Right Ear: External ear normal.   Left Ear: External ear normal.   Mouth/Throat: Oropharynx is clear and moist.   Eyes: Conjunctivae are normal.   Neck: Neck supple.   Cardiovascular: Regular rhythm and normal heart sounds.    Pulmonary/Chest: Breath sounds normal. He has no wheezes. He has no rales.   Neurological: He is alert.   Skin: Skin is warm.       Labs:  Results for orders placed or performed in visit on 04/03/18 (from the past 24 hour(s))   Influenza A/B antigen   Result Value Ref Range    Influenza A/B Agn Specimen Nasal     Influenza A Negative NEG^Negative    Influenza B Negative NEG^Negative       X-Ray was not done.    ASSESSMENT:      ICD-10-CM    1. Influenza-like illness R69 oseltamivir (TAMIFLU) 75 MG capsule   2. Fever and chills R50.9 Influenza A/B antigen        Medical Decision Making:    Differential Diagnosis:  URI Adult/Peds:  Influenza, Pneumonia and Viral syndrome    Comorbid Conditions:  Adult:  chronic sinusitis    PLAN:  Tamiflu is prescribed.  I also recommendedTylenol, Ibuprofen, Fluids and Rest. He is provided with a work note excusing his absence from work for the next couple days.  Follow-up if any worsening symptoms.  Patient understands and agrees with the plan, he is discharged in no acute distress.    Followup:    If not improving or if " condition worsens, follow up with your Primary Care Provider    Patient Instructions   Please take prescribed medication as directed.  Please take ibuprofen or tylenol as needed for fever.    Follow up if any worsening symptoms.

## 2018-04-03 NOTE — NURSING NOTE
"Chief Complaint   Patient presents with     Sick     X LAST NIGHT, ST, congestion, runny nose, fever, HA, dizzy, sweats and chills, fatigue, took nothing       Initial /78 (BP Location: Right arm, Patient Position: Chair, Cuff Size: Adult Large)  Pulse 78  Temp 98.1  F (36.7  C) (Oral)  Resp 16  Ht 5' 11\" (1.803 m)  Wt 175 lb (79.4 kg)  SpO2 98%  BMI 24.41 kg/m2 Estimated body mass index is 24.41 kg/(m^2) as calculated from the following:    Height as of this encounter: 5' 11\" (1.803 m).    Weight as of this encounter: 175 lb (79.4 kg).  Medication Reconciliation: nathan Galicia CMA      "

## 2018-04-03 NOTE — MR AVS SNAPSHOT
"              After Visit Summary   4/3/2018    Kristen Hernandes    MRN: [id number]           Patient Information     Date Of Birth          1990        Visit Information        Provider Department      4/3/2018 5:50 PM Tiki Tovar PA-C [facility]        Today's Diagnoses     Influenza-like illness    -  1    Fever and chills          Care Instructions    Please take prescribed medication as directed.  Please take ibuprofen or tylenol as needed for fever.    Follow up if any worsening symptoms.          Follow-ups after your visit        Who to contact     If you have questions or need follow up information about today's clinic visit or your schedule please contact [facility] directly at [phone].  Normal or non-critical lab and imaging results will be communicated to you by MyChart, letter or phone within 4 business days after the clinic has received the results. If you do not hear from us within 7 days, please contact the clinic through [employer] or phone. If you have a critical or abnormal lab result, we will notify you by phone as soon as possible.  Submit refill requests through [employer] or call your pharmacy and they will forward the refill request to us. Please allow 3 business days for your refill to be completed.          Additional Information About Your Visit        MyChart Information     [employer] lets you send messages to your doctor, view your test results, renew your prescriptions, schedule appointments and more. To sign up, go to [email or web address] . Click on \"Log in\" on the left side of the screen, which will take you to the Welcome page. Then click on \"Sign up Now\" on the right side of the page.     You will be asked to enter the access code listed below, as well as some personal information. Please follow the directions to create your username and password.     Your access code is: [email or web address]  Expires: 7/2/2018  6:46 PM     Your access code " "will  in 90 days. If you need help or a new code, please call your [facility] clinic or [phone].        Care EveryWhere ID     This is your Care EveryWhere ID. This could be used by other organizations to access your [facility] medical records  [id number]        Your Vitals Were     Pulse Temperature Respirations Height Pulse Oximetry BMI (Body Mass Index)    78 98.1  F (36.7  C) (Oral) 16 5' 11\" (1.803 m) 98% 24.41 kg/m2       Blood Pressure from Last 3 Encounters:   18 114/78   17 120/80   17 133/78    Weight from Last 3 Encounters:   18 175 lb (79.4 kg)   17 177 lb 3 oz (80.4 kg)   17 180 lb (81.6 kg)              We Performed the Following     Influenza A/B antigen          Today's Medication Changes          These changes are accurate as of 4/3/18  6:47 PM.  If you have any questions, ask your nurse or doctor.               Start taking these medicines.        Dose/Directions    oseltamivir 75 MG capsule   Commonly known as:  TAMIFLU   Used for:  Influenza-like illness   Started by:  Tiki Tovar PA-C        Dose:  75 mg   Take 1 capsule (75 mg) by mouth 2 times daily   Quantity:  10 capsule   Refills:  0            Where to get your medicines      These medications were sent to [facility] [address] [place] [address] AT [address]  [id number] [place] [address]     Phone:  [phone]     oseltamivir 75 MG capsule                Primary Care Provider Office Phone # Fax #    Alejandro Ashu Wells [phone] [address] [address]        Equal Access to Services     SIMON HORNE AH: Angel watkinso Somitzi, waaxda luqadaha, qaybta kaalmada adeegyada, filippo ludwig. So [facility] [phone].    ATENCIÓN: Si habla español, tiene a luis disposición servicios gratuitos de asistencia lingüística. Llame al [phone].    We comply with " applicable federal civil rights laws and [place] laws. We do not discriminate on the basis of race, color, national origin, age, disability, sex, sexual orientation, or gender identity.            Thank you!     Thank you for choosing [facility]  for your care. Our goal is always to provide you with excellent care. Hearing back from our patients is one way we can continue to improve our services. Please take a few minutes to complete the written survey that you may receive in the mail after your visit with us. Thank you!             Your Updated Medication List - Protect others around you: Learn how to safely use, store and throw away your medicines at www.disposemymeds.org.          This list is accurate as of 4/3/18  6:47 PM.  Always use your most recent med list.                   Brand Name Dispense Instructions for use Diagnosis    FLUoxetine 40 MG capsule    PROzac    30 capsule    Take 1 capsule (40 mg) by mouth daily        lisdexamfetamine 30 MG capsule    VYVANSE    30 capsule    Take 1 capsule (30 mg) by mouth daily    ADD (attention deficit disorder)       oseltamivir 75 MG capsule    TAMIFLU    10 capsule    Take 1 capsule (75 mg) by mouth 2 times daily    Influenza-like illness       propranolol 20 MG tablet    INDERAL    60 tablet    Take 1 tablet (20 mg) by mouth 2 times daily

## 2018-04-03 NOTE — PATIENT INSTRUCTIONS
Please take prescribed medication as directed.  Please take ibuprofen or tylenol as needed for fever.    Follow up if any worsening symptoms.

## 2018-04-03 NOTE — LETTER
April 3, 2018      Kristen Hernandes  [address] [place] [id number]        To Whom It May Concern:    Kristen Hernandes  was seen on 4/3/2018  Please excuse him from work 4/4/2018 and 4/5/2018 due to acute medical illness.        Sincerely,        Tiki Tovar PA-C

## 2018-05-21 NOTE — TELEPHONE ENCOUNTER
I called patient and left message to return our call at [phone]., I left detailed message with referral info and phone number for sleep evaluation. Chelsey SOLO, ELEONORA Fry, ELEONORA      
I have ordered for a referral for sleep evaluation.  Patient will need to call this number to set up an appointment: [phone]  
Reason for Call:  Other     Detailed comments: patient said he had discussed sleep study with Dr Wells.  He would like a recommendation/referral as to where to go for it.     Phone Number Patient can be reached at: Cell number on file:    Telephone Information:   Mobile [phone]       Best Time: any    Can we leave a detailed message on this number? YES    Call taken on 2/13/2017 at 4:06 PM by Angelita Benton      
(4) completely dependent

## 2018-11-08 ENCOUNTER — HOSPITAL ENCOUNTER (EMERGENCY)
Facility: CLINIC | Age: 28
Discharge: HOME | End: 2018-11-08
Attending: EMERGENCY MEDICINE | Admitting: EMERGENCY MEDICINE
Payer: COMMERCIAL

## 2018-11-08 VITALS
SYSTOLIC BLOOD PRESSURE: 138 MMHG | TEMPERATURE: 98 F | OXYGEN SATURATION: 100 % | DIASTOLIC BLOOD PRESSURE: 90 MMHG | RESPIRATION RATE: 18 BRPM

## 2018-11-08 DIAGNOSIS — Z76.0 ENCOUNTER FOR MEDICATION REFILL: ICD-10-CM

## 2018-11-08 DIAGNOSIS — F98.8 ATTENTION DEFICIT DISORDER, UNSPECIFIED HYPERACTIVITY PRESENCE: ICD-10-CM

## 2018-11-08 PROCEDURE — 99282 EMERGENCY DEPT VISIT SF MDM: CPT

## 2018-11-08 RX ORDER — LISDEXAMFETAMINE DIMESYLATE 40 MG/1
40 CAPSULE ORAL DAILY
Qty: 8 CAPSULE | Refills: 0 | Status: SHIPPED | OUTPATIENT
Start: 2018-11-08

## 2018-11-08 ASSESSMENT — ENCOUNTER SYMPTOMS
PALPITATIONS: 0
NERVOUS/ANXIOUS: 0
NAUSEA: 0
VOMITING: 0

## 2018-11-08 NOTE — ED AVS SNAPSHOT
[facility] Emergency Department    [address]    [facility] [address]    Phone:  [phone]    Fax:  [phone]                                       Kristen Hernandes   MRN: [id number]    Department:  [facility] Emergency Department   Date of Visit:  11/8/2018           After Visit Summary Signature Page     I have received my discharge instructions, and my questions have been answered. I have discussed any challenges I see with this plan with the nurse or doctor.    ..........................................................................................................................................  Patient/Patient Representative Signature      ..........................................................................................................................................  Patient Representative Print Name and Relationship to Patient    ..................................................               ................................................  Date                                   Time    ..........................................................................................................................................  Reviewed by Signature/Title    ...................................................              ..............................................  Date                                               Time          22EPIC Rev 08/18

## 2018-11-08 NOTE — ED AVS SNAPSHOT
[facility] Emergency Department    [address]    [facility] [address]    Phone:  [phone]    Fax:  [phone]                                       Kristen Hernandes   MRN: [id number]    Department:  [facility] Emergency Department   Date of Visit:  11/8/2018           Patient Information     Date Of Birth          1990        Your diagnoses for this visit were:     Attention deficit disorder, unspecified hyperactivity presence     Encounter for medication refill        You were seen by Charlie Hitchccok DO.      Follow-up Information     Follow up with [facility]. Call in 1 day.    Specialties:  Internal Medicine, Pediatrics, Obstetrics & Gynecology    Why:  To establish care with a primary care clinic    Contact information:    [address] [address] [phone]        Follow up with [facility] Emergency Department.    Specialty:  EMERGENCY MEDICINE    Why:  If symptoms worsen    Contact information:    [address]  [facility] [address] [phone]        Discharge Instructions         Attention-Deficit/Hyperactivity Disorder (ADHD) in Adults  You ve always had trouble concentrating. Your mind wanders, and it s hard to finish tasks. As a result, you didn t do well in school. And now, you often struggle with your job. Sometimes this makes you dockery or depressed. These may be symptoms of attention-deficit/hyperactivity disorder (ADHD). To find out more, talk to your healthcare provider. He or she can offer guidance and support.  Symptoms  of ADHD in adults  For an adult to be diagnosed with ADHD, the symptoms must have been present since childhood. The symptoms may include:    Trouble thinking things through    Low self-esteem    Depression    Trouble holding a job    Memory problems    Problems with a marriage or relationship    Lack of discipline   What is  ADHD?  Attention-deficit/hyperactivity disorder makes it hard to focus your mind. You may daydream a lot. And you may be restless much of the time. As a result, you may have trouble with detailed or boring work. And it may be hard to stick with one project for very long. You also may forget things. Or, you may miss key points during a lecture or meeting. You may even have trouble sitting through a movie or concert. At times, you may feel frustrated or angry. This can affect your relationships with others.  Who does it affect?  ADHD starts in childhood. Sometimes, your symptoms may improve as you get older. But they also may persist into your adult years. ADHD is often thought of as a  kid s problem.  That s why it s often missed in adults. In fact, many parents learn they have ADHD when their children are diagnosed.  What causes it?  The exact cause of ADHD isn t known. The disorder does run in families. Having one parent with ADHD makes it more likely you ll have it too. And the part of your brain that controls attention may be involved. Certain brain chemicals that are out of balance may also play a role.  What can be done?  The first step is finding out if you really have ADHD. Your doctor will use special guidelines to diagnose the disorder. Most adults with ADHD are greatly helped by therapy and coaching. In some cases, your doctor may also prescribe medicine to ease your symptoms.  Resources    National Resource Center on AD/HDwww.vbet9nkbh.org    Attention Deficit Disorder Associationwww.add.org   Date Last Reviewed: 1/1/2017 2000-2018 [employer]. [address] [place] [address], [place] [address]. All rights reserved. This information is not intended as a substitute for professional medical care. Always follow your healthcare professional's instructions.          24 Hour Appointment Hotline       To make an appointment at any [facility], call [phone] ([phone]). If you don't have a  family doctor or clinic, we will help you find one. [place] clinics are conveniently located to serve the needs of you and your family.             Review of your medicines      CONTINUE these medicines which may have CHANGED, or have new prescriptions. If we are uncertain of the size of tablets/capsules you have at home, strength may be listed as something that might have changed.        Dose / Directions Last dose taken    lisdexamfetamine 40 MG capsule   Commonly known as:  VYVANSE   Dose:  40 mg   What changed:    - medication strength  - how much to take   Quantity:  8 capsule        Take 1 capsule (40 mg) by mouth daily   Refills:  0          Our records show that you are taking the medicines listed below. If these are incorrect, please call your family doctor or clinic.        Dose / Directions Last dose taken    FLUoxetine 40 MG capsule   Commonly known as:  PROzac   Dose:  40 mg   Quantity:  30 capsule        Take 1 capsule (40 mg) by mouth daily   Refills:  1        oseltamivir 75 MG capsule   Commonly known as:  TAMIFLU   Dose:  75 mg   Quantity:  10 capsule        Take 1 capsule (75 mg) by mouth 2 times daily   Refills:  0        propranolol 20 MG tablet   Commonly known as:  INDERAL   Dose:  20 mg   Quantity:  60 tablet        Take 1 tablet (20 mg) by mouth 2 times daily   Refills:  1                Prescriptions were sent or printed at these locations (1 Prescription)                   Other Prescriptions                Printed at Department/Unit printer (1 of 1)         lisdexamfetamine (VYVANSE) 40 MG capsule                Orders Needing Specimen Collection     None      Pending Results     No orders found from 11/6/2018 to 11/9/2018.            Pending Culture Results     No orders found from 11/6/2018 to 11/9/2018.            Pending Results Instructions     If you had any lab results that were not finalized at the time of your Discharge, you can call the ED Lab Result RN at [phone]. You  will be contacted by this team for any positive Lab results or changes in treatment. The nurses are available 7 days a week from 10A to 6:30P.  You can leave a message 24 hours per day and they will return your call.        Test Results From Your Hospital Stay               Clinical Quality Measure: Blood Pressure Screening     Your blood pressure was checked while you were in the emergency department today. The last reading we obtained was  BP: 138/90 . Please read the guidelines below about what these numbers mean and what you should do about them.  If your systolic blood pressure (the top number) is less than 120 and your diastolic blood pressure (the bottom number) is less than 80, then your blood pressure is normal. There is nothing more that you need to do about it.  If your systolic blood pressure (the top number) is 120-139 or your diastolic blood pressure (the bottom number) is 80-89, your blood pressure may be higher than it should be. You should have your blood pressure rechecked within a year by a primary care provider.  If your systolic blood pressure (the top number) is 140 or greater or your diastolic blood pressure (the bottom number) is 90 or greater, you may have high blood pressure. High blood pressure is treatable, but if left untreated over time it can put you at risk for heart attack, stroke, or kidney failure. You should have your blood pressure rechecked by a primary care provider within the next 4 weeks.  If your provider in the emergency department today gave you specific instructions to follow-up with your doctor or provider even sooner than that, you should follow that instruction and not wait for up to 4 weeks for your follow-up visit.        Thank you for choosing [facility]       Thank you for choosing [facility] for your care. Our goal is always to provide you with excellent care. Hearing back from our patients is one way we can continue to improve our services. Please take a few minutes  "to complete the written survey that you may receive in the mail after you visit with us. Thank you!        [employer] Information     [employer] lets you send messages to your doctor, view your test results, renew your prescriptions, schedule appointments and more. To sign up, go to [email or web address] 234 beds at the [email or web address] . Click on \"Log in\" on the left side of the screen, which will take you to the Welcome page. Then click on \"Sign up Now\" on the right side of the page.     You will be asked to enter the access code listed below, as well as some personal information. Please follow the directions to create your username and password.     Your access code is: [email or web address]  Expires: 2019  7:39 PM     Your access code will  in 90 days. If you need help or a new code, please call your [facility] clinic or [phone].        Care EveryWhere ID     This is your Care EveryWhere ID. This could be used by other organizations to access your [facility] medical records  [id number]        Equal Access to Services     [facility] HospitalCHRISTIE : Hadii rene church Somitzi, waaxda luqadaha, qaybta kaalmatammie stewart, filippo duenas . So [facility] [phone].    ATENCIÓN: Si habla español, tiene a luis disposición servicios gratuitos de asistencia lingüística. Llame al [phone].    We comply with applicable federal civil rights laws and [place] laws. We do not discriminate on the basis of race, color, national origin, age, disability, sex, sexual orientation, or gender identity.            After Visit Summary       This is your record. Keep this with you and show to your community pharmacist(s) and doctor(s) at your next visit.                  "

## 2018-11-09 NOTE — DISCHARGE INSTRUCTIONS
Attention-Deficit/Hyperactivity Disorder (ADHD) in Adults  You ve always had trouble concentrating. Your mind wanders, and it s hard to finish tasks. As a result, you didn t do well in school. And now, you often struggle with your job. Sometimes this makes you dockery or depressed. These may be symptoms of attention-deficit/hyperactivity disorder (ADHD). To find out more, talk to your healthcare provider. He or she can offer guidance and support.  Symptoms  of ADHD in adults  For an adult to be diagnosed with ADHD, the symptoms must have been present since childhood. The symptoms may include:    Trouble thinking things through    Low self-esteem    Depression    Trouble holding a job    Memory problems    Problems with a marriage or relationship    Lack of discipline   What is ADHD?  Attention-deficit/hyperactivity disorder makes it hard to focus your mind. You may daydream a lot. And you may be restless much of the time. As a result, you may have trouble with detailed or boring work. And it may be hard to stick with one project for very long. You also may forget things. Or, you may miss key points during a lecture or meeting. You may even have trouble sitting through a movie or concert. At times, you may feel frustrated or angry. This can affect your relationships with others.  Who does it affect?  ADHD starts in childhood. Sometimes, your symptoms may improve as you get older. But they also may persist into your adult years. ADHD is often thought of as a  kid s problem.  That s why it s often missed in adults. In fact, many parents learn they have ADHD when their children are diagnosed.  What causes it?  The exact cause of ADHD isn t known. The disorder does run in families. Having one parent with ADHD makes it more likely you ll have it too. And the part of your brain that controls attention may be involved. Certain brain chemicals that are out of balance may also play a role.  What can be done?  The first step  is finding out if you really have ADHD. Your doctor will use special guidelines to diagnose the disorder. Most adults with ADHD are greatly helped by therapy and coaching. In some cases, your doctor may also prescribe medicine to ease your symptoms.  Resources    National Resource Center on AD/HDwww.grga4dqxi.org    Attention Deficit Disorder Associationwww.add.org   Date Last Reviewed: 1/1/2017 2000-2018 [employer]. [address] [place] [address], [place] [address]. All rights reserved. This information is not intended as a substitute for professional medical care. Always follow your healthcare professional's instructions.

## 2018-11-09 NOTE — ED TRIAGE NOTES
States not having a primary doctor, ran out of vyvanse and was supposed to get refill script mailed to him from psychiatrist from [place] and has not received it

## 2018-11-09 NOTE — ED PROVIDER NOTES
History     Chief Complaint:  Medication refill    The history is provided by the patient.      Kristen Hernandes is a 28 year old male with a medical history of ADD, anxiety, and depression who presents for a medication refill. The patient reports he was initially on Adderall up until 3 years ago, but due to aggravation of anxiety and heart rate, he was switched to Vyvanse. Patient recently moved to [place] from [place] and sees a psychiatrist there, who typically writes his prescriptions that get mailed to his house every month. His last prescription was mailed on 10/18. Patient ran out of Vyvanse and contacted his psychiatrist's office, but has not been able to receive the prescription. He was then seen at Urgent Care this evening to see if he is able to receive a prescription, but was advised to come to the emergency department to obtain an emergency refill until his new prescription is sent out. Denies palpitations, worsened anxiety, nausea, vomiting, or any other symptoms at this time.     Allergies:  No known drug allergies      Medications:    Prozac  Vyvanse  Inderal    Past Medical History:    ADD  Anxiety  Depression  Psychological insomnia    Past Surgical History:    Tonsillectomy  Rhinoplasty    Family History:    Diabetes  Anxiety  Depression    Social History:  Smoking status: Light tobacco smoker  Alcohol use: No   Marital Status:  Single [1]  PCP: Alejandro Wells   Patient works as an .     Review of Systems   Cardiovascular: Negative for palpitations.   Gastrointestinal: Negative for nausea and vomiting.   Psychiatric/Behavioral: The patient is not nervous/anxious.    All other systems reviewed and are negative.    Physical Exam   Patient Vitals for the past 24 hrs:   BP Temp Temp src Heart Rate Resp SpO2   11/08/18 1912 138/90 - - - - -   11/08/18 1910 - 98  F (36.7  C) Oral 67 18 100 %      Physical Exam  Constitutional: Vital signs reviewed as above.   Head:  No external signs of trauma noted.  Eyes: Pupils are equal, round, and reactive to light.   Neck: No JVD noted  Cardiovascular: Normal rate, regular rhythm and normal heart sounds.  No murmur heard. Equal B/L peripheral pulses.  Pulmonary/Chest: Effort normal and breath sounds normal. No respiratory distress. Patient has no wheezes. Patient has no rales.   Abdominal: Soft. There is no tenderness.   Musculoskeletal/Extremities: No edema noted  Neurological: Patient is alert and oriented to person, place, and time.   Skin: Skin is warm and dry. There is no diaphoresis noted.   Psychiatric: The patient appears calm.     Emergency Department Course   Emergency Department Course:  Past medical records, nursing notes, and vitals reviewed.  1915: I searched patient on the [place] .   1920: I performed an exam of the patient and obtained history, as documented above.    Patient discharged home with instructions regarding supportive care, medications, and reasons to return. The importance of close follow-up was reviewed.      Impression & Plan    Medical Decision Making:  This 28-year-old male patient presents the ED due to medication needs.  Please see the HPI and exam for specifics. He was sent here from Urgent Care for this script. The patient does have an established history with a physician in [place].  I reviewed care everywhere and I do see documentation of this.  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  He recently signed a 6-month extension here in the [facility] and is looking to establish care with a physician locally.  The patient believes he will have his longer term prescription sent to him next week.  In the meantime I did elect to prescribe a short term prescription for the patient.  I counseled him that he needs to establish consistent outpatient care. He understands this. Anticipatory guidance given prior to discharge.    Diagnosis:    ICD-10-CM   1. Attention deficit disorder, unspecified  hyperactivity presence F98.8   2. Encounter for medication refill Z76.0     Disposition:  discharged to home    Discharge Medications:  Discharge Medication List as of 11/8/2018  7:39 PM      CONTINUE these medications which have CHANGED    Details   lisdexamfetamine (VYVANSE) 40 MG capsule Take 1 capsule (40 mg) by mouth daily, Disp-8 capsule, R-0, Local Print               Loyda Llamas  11/8/2018   [facility] EMERGENCY DEPARTMENT  ILoyda Do, am serving as a scribe at 7:20 PM on 11/8/2018 to document services personally performed by Charlie Hitchcock DO based on my observations and the provider's statements to me.       Charlie Hitchcock DO  11/08/18 2005

## 2018-11-17 ENCOUNTER — HOSPITAL ENCOUNTER (EMERGENCY)
Facility: CLINIC | Age: 28
Discharge: HOME | End: 2018-11-17
Attending: NURSE PRACTITIONER | Admitting: NURSE PRACTITIONER
Payer: COMMERCIAL

## 2018-11-17 VITALS
SYSTOLIC BLOOD PRESSURE: 120 MMHG | DIASTOLIC BLOOD PRESSURE: 79 MMHG | BODY MASS INDEX: 24.48 KG/M2 | RESPIRATION RATE: 14 BRPM | OXYGEN SATURATION: 97 % | WEIGHT: 175.49 LBS | TEMPERATURE: 98.3 F | HEART RATE: 76 BPM

## 2018-11-17 DIAGNOSIS — F98.8 ATTENTION DEFICIT DISORDER, UNSPECIFIED HYPERACTIVITY PRESENCE: ICD-10-CM

## 2018-11-17 DIAGNOSIS — Z76.0 ENCOUNTER FOR MEDICATION REFILL: Primary | ICD-10-CM

## 2018-11-17 PROCEDURE — 99283 EMERGENCY DEPT VISIT LOW MDM: CPT

## 2018-11-17 RX ORDER — LISDEXAMFETAMINE DIMESYLATE 40 MG/1
40 CAPSULE ORAL EVERY MORNING
Qty: 8 CAPSULE | Refills: 0 | Status: SHIPPED | OUTPATIENT
Start: 2018-11-17

## 2018-11-17 NOTE — ED AVS SNAPSHOT
[facility] Emergency Department    [address] [place] [address]    Phone:  [phone]    Fax:  [phone]                                       Kristen Hernandes   MRN: [id number]    Department:  [facility] Emergency Department   Date of Visit:  11/17/2018           Patient Information     Date Of Birth          1990        Your diagnoses for this visit were:     Attention deficit disorder, unspecified hyperactivity presence     Encounter for medication refill        You were seen by Bj Le, BRITTNEY CNP.      Follow-up Information     Follow up with No Ref-Primary, Physician In 1 day.        Discharge Instructions       Establish primary care in the [facility] this week see list.     24 Hour Appointment Hotline       To make an appointment at any [facility] clinic, call [phone] ([phone]). If you don't have a family doctor or clinic, we will help you find one. [facility] clinics are conveniently located to serve the needs of you and your family.             Review of your medicines      Our records show that you are taking the medicines listed below. If these are incorrect, please call your family doctor or clinic.        Dose / Directions Last dose taken    FLUoxetine 40 MG capsule   Commonly known as:  PROzac   Dose:  40 mg   Quantity:  30 capsule        Take 1 capsule (40 mg) by mouth daily   Refills:  1        oseltamivir 75 MG capsule   Commonly known as:  TAMIFLU   Dose:  75 mg   Quantity:  10 capsule        Take 1 capsule (75 mg) by mouth 2 times daily   Refills:  0        propranolol 20 MG tablet   Commonly known as:  INDERAL   Dose:  20 mg   Quantity:  60 tablet        Take 1 tablet (20 mg) by mouth 2 times daily   Refills:  1          ASK your doctor about these medications        Dose / Directions Last dose taken    * lisdexamfetamine 40 MG capsule   Commonly known as:  VYVANSE   Dose:  40 mg   What changed:  Another medication with the  same name was added. Make sure you understand how and when to take each.   Quantity:  8 capsule   Ask about: Which instructions should I use?        Take 1 capsule (40 mg) by mouth daily   Refills:  0        * lisdexamfetamine 40 MG capsule   Commonly known as:  VYVANSE   Dose:  40 mg   What changed:  You were already taking a medication with the same name, and this prescription was added. Make sure you understand how and when to take each.   Quantity:  8 capsule   Ask about: Which instructions should I use?        Take 1 capsule (40 mg) by mouth every morning   Refills:  0        * Notice:  This list has 2 medication(s) that are the same as other medications prescribed for you. Read the directions carefully, and ask your doctor or other care provider to review them with you.            Prescriptions were sent or printed at these locations (1 Prescription)                   Other Prescriptions                Printed at Department/Unit printer (1 of 1)         lisdexamfetamine (VYVANSE) 40 MG capsule                Orders Needing Specimen Collection     None      Pending Results     No orders found from 11/15/2018 to 11/18/2018.            Pending Culture Results     No orders found from 11/15/2018 to 11/18/2018.            Pending Results Instructions     If you had any lab results that were not finalized at the time of your Discharge, you can call the ED Lab Result RN at [phone]. You will be contacted by this team for any positive Lab results or changes in treatment. The nurses are available 7 days a week from 10A to 6:30P.  You can leave a message 24 hours per day and they will return your call.        Test Results From Your Hospital Stay               Clinical Quality Measure: Blood Pressure Screening     Your blood pressure was checked while you were in the emergency department today. The last reading we obtained was  BP: 120/79 . Please read the guidelines below about what these numbers mean and what you  "should do about them.  If your systolic blood pressure (the top number) is less than 120 and your diastolic blood pressure (the bottom number) is less than 80, then your blood pressure is normal. There is nothing more that you need to do about it.  If your systolic blood pressure (the top number) is 120-139 or your diastolic blood pressure (the bottom number) is 80-89, your blood pressure may be higher than it should be. You should have your blood pressure rechecked within a year by a primary care provider.  If your systolic blood pressure (the top number) is 140 or greater or your diastolic blood pressure (the bottom number) is 90 or greater, you may have high blood pressure. High blood pressure is treatable, but if left untreated over time it can put you at risk for heart attack, stroke, or kidney failure. You should have your blood pressure rechecked by a primary care provider within the next 4 weeks.  If your provider in the emergency department today gave you specific instructions to follow-up with your doctor or provider even sooner than that, you should follow that instruction and not wait for up to 4 weeks for your follow-up visit.        Thank you for choosing [facility]       Thank you for choosing [facility] for your care. Our goal is always to provide you with excellent care. Hearing back from our patients is one way we can continue to improve our services. Please take a few minutes to complete the written survey that you may receive in the mail after you visit with us. Thank you!        [facility] Information     The [employer] lets you send messages to your doctor, view your test results, renew your prescriptions, schedule appointments and more. To sign up, go to [email or web address] . Click on \"Log in\" on the left side of the screen, which will take you to the Welcome page. Then click on \"Sign up Now\" on the right side of the page.     You will be asked to enter the access code listed below, as well as some " personal information. Please follow the directions to create your username and password.     Your access code is: [email or web address]  Expires: 2019  7:39 PM     Your access code will  in 90 days. If you need help or a new code, please call your [facility] clinic or [phone].        Care EveryWhere ID     This is your Care EveryWhere ID. This could be used by other organizations to access your [facility] medical records  [id number]        Equal Access to Services     [facility] Center[facility]RISTIE : Angel watkinso Somitzi, waaxda luqadaha, qaybta kaalmada adebea, filippo duenas . So [facility] [phone].    ATENCIÓN: Si habla español, tiene a luis disposición servicios gratuitos de asistencia lingüística. Llame al [phone].    We comply with applicable federal civil rights laws and [place] laws. We do not discriminate on the basis of race, color, national origin, age, disability, sex, sexual orientation, or gender identity.            After Visit Summary       This is your record. Keep this with you and show to your community pharmacist(s) and doctor(s) at your next visit.

## 2018-11-17 NOTE — ED NOTES
A/O. VSS.Pt verbalized understanding of d/c instructions and ambulated to lobby steady gait. List of PCP given

## 2018-11-17 NOTE — ED TRIAGE NOTES
Patient presents again for a medication refill for his Vyvanse. Last refill was in the ER was on 11/8/2018. Patient reports primary in [place] mailed it to him but never received it and he currently has not established a primary. Patient reports he made an appointment with a primary but unable to get in for 3 weeks.

## 2018-11-17 NOTE — ED PROVIDER NOTES
History     Chief Complaint:  Medication Refill      HPI   Kristen Hernandes is a 28 year old male who presents with a medication refill. The patient states that he recently moved here from [place] as he is a government contracted   and is currently trying to establish primary as well as psychiatry care in the area. He states that when he moved, his clinic from [place] mailed his prescription to him, however, he did not receive the prescription. He was seen here in the ED 11/8/18 and his Vyvanse was refilled. The patient contacted his primary in [place] again and they resent the prescription in the mail, but he still has not received the prescription. The patient has made an appointment with [place] [facility] in [place], however, the appointment is not for several weeks. He presents back to the ED today for another refill of his Vyvanse as he is concerned his ADD will impact his work.     Allergies:  No known drug allergies.      Medications:    Vyvanse   Prozac  Inderal     Past Medical History:    ADD   Anxiety   Depressive Disorder  Moderate episode of recurrent major depression disorder    Past Surgical History:    Tonsillectomy   Rhinoplasty     Family History:    Diabetes-Mother  Depression-Mother  Anxiety Disorder-Mother    Social History:  Marital Status: Single  Presents to the ED alone  Tobacco Use: Light tobacco smoker  Alcohol Use: Yes     Review of Systems   All other systems reviewed and are negative.    Physical Exam   First Vitals:  BP: 120/79  Pulse: 76  Temp: 98.3  F (36.8  C)  Resp: 14  Weight: 79.6 kg (175 lb 7.8 oz)  SpO2: 97 %      Physical Exam  Eyes: Pupils equally round  HENT: Moist mucus membranes.  Cardiovascular: Regular rate and rhythm   Respiratory: Clear throughout  Musculoskeletal: No asymmetry  Skin: Normal, without rash.  Lymphatic: No edema  Neurologic: Cranial nerves grossly intact, normal cognition, no apparent deficits.  Psychiatric: Normal affect.      Emergency Department Course   Emergency Department Course:  Nursing notes and vitals reviewed.  (5324) I performed an exam of the patient as documented above.    Findings and plan explained to the patient. Patient discharged home with instructions regarding supportive care, medications, and reasons to return. The importance of close follow-up was reviewed.     Impression & Plan    Medical Decision Making:    Kristen presents for a medication refill. 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 He states that when he moved, his clinic from [place] mailed his prescription to him, however, he did not receive the prescription. He was seen here in the ED 11/8/18 and his Vyvanse was refilled.     I had a detailed discussion with patient regarding medication refill in the ED and the hospitals policy. Patient understand will receive another refill today and was encouraged to establish PCP and was given phone numbers to both [facility] and [facility]. Patient hs no other medical concerns today thus no further work up is indicated. Patient is hemodynamically stable. Patient was provided return instruction, verbalizes understanding.      Diagnosis:    ICD-10-CM    1. Encounter for medication refill Z76.0    2. Attention deficit disorder, unspecified hyperactivity presence F98.8        Disposition:  discharged to home    Discharge Medications:  Discharge Medication List as of 11/17/2018  4:20 PM      START taking these medications    Details   !! lisdexamfetamine (VYVANSE) 40 MG capsule Take 1 capsule (40 mg) by mouth every morning, Disp-8 capsule, R-0, Local Print       !! - Potential duplicate medications found. Please discuss with provider.            I, Halley Miller, am serving as a scribe on 11/17/2018 at 4:15 PM to personally document services performed by Bj Le  BRITTNEY, CNP based on my observations and the provider's statements to me.    11/17/2018   [facility] EMERGENCY DEPARTMENT       Bj Le, APRN CNP  11/17/18 1921

## 2018-11-17 NOTE — ED AVS SNAPSHOT
[facility] Emergency Department    [address]    [facility] [address]    Phone:  [phone]    Fax:  [phone]                                       Kristen Hernandes   MRN: [id number]    Department:  [facility] Emergency Department   Date of Visit:  11/17/2018           After Visit Summary Signature Page     I have received my discharge instructions, and my questions have been answered. I have discussed any challenges I see with this plan with the nurse or doctor.    ..........................................................................................................................................  Patient/Patient Representative Signature      ..........................................................................................................................................  Patient Representative Print Name and Relationship to Patient    ..................................................               ................................................  Date                                   Time    ..........................................................................................................................................  Reviewed by Signature/Title    ...................................................              ..............................................  Date                                               Time          22EPIC Rev 08/18

## 2019-11-20 ENCOUNTER — OFFICE VISIT (OUTPATIENT)
Dept: SLEEP MEDICINE | Facility: CLINIC | Age: 29
End: 2019-11-20
Payer: COMMERCIAL

## 2019-11-20 VITALS
OXYGEN SATURATION: 99 % | HEART RATE: 61 BPM | RESPIRATION RATE: 16 BRPM | BODY MASS INDEX: 24.92 KG/M2 | WEIGHT: 178 LBS | HEIGHT: 71 IN | SYSTOLIC BLOOD PRESSURE: 119 MMHG | DIASTOLIC BLOOD PRESSURE: 82 MMHG

## 2019-11-20 DIAGNOSIS — G47.30 OBSERVED SLEEP APNEA: ICD-10-CM

## 2019-11-20 DIAGNOSIS — R40.0 SLEEPINESS: Primary | ICD-10-CM

## 2019-11-20 DIAGNOSIS — G47.00 INSOMNIA, UNSPECIFIED TYPE: ICD-10-CM

## 2019-11-20 DIAGNOSIS — R06.83 SNORING: ICD-10-CM

## 2019-11-20 PROCEDURE — 99215 OFFICE O/P EST HI 40 MIN: CPT | Performed by: PHYSICIAN ASSISTANT

## 2019-11-20 ASSESSMENT — MIFFLIN-ST. JEOR: SCORE: 1794.53

## 2019-11-20 NOTE — PATIENT INSTRUCTIONS
Instructions for treating Delayed Sleep Phase Syndrome:  Try to be in bed 11 PM to 7 AM.     Delayed Sleep Phase Syndrome (DSPS) means that your body's internal timing is set late compared to the 24 hour day. Therefore, it is often difficult to get up on time for work in the morning and sometimes difficult to fall asleep on time, in order to get enough sleep. People with DSPS often tend to like to stay up late on weekends and sleep in until between 10 AM and noon, sometimes even later.This is actually a bad habit that will perpetuate the problem. It reinforces your body's tendency to be on that later schedule.    You should go to bed when you are sleepy and ready to sleep. During this entire process, you should not engage in activities that may make it worse, such as watching TV in bed, leaving the TV on all night, drinking any caffeine 6 hours before bed or exercising 1-2 hours before bed.     Start taking Melatonin, 3 mg at bedtime.   Upon awakening, get exposure to sun-light for about 30-45 minutes. You do not need to look at the sun, in fact, this is dangerous. Reading the paper with the sun shining on you is adequate.  Alternatively, you may use a Seasonal Affective Disorder Lamp (intensity 10,000 Lux) instead of the sun. The lamp should be positioned 1-2 arms lengths away from you. They lamps are sold at Home Medical Companies such as [employer] or [employer]. A prescription can be written to get insurance coverage in some cases. They are also sold on [employer].    Using the light and melatonin should help march your internal clock (known as your circadian rhythms) gradually earlier. As your bedtime advances, remember to take your melatonin earlier, keeping it 5 hours before your fall asleep time.    Avoid naps and sleeping in because sleeping during the day will delay your body's clock and you will have to start from scratch.     More information about light therapy:  If the cost of any light  box is too much, you can also purchase a compact fluorescent all spectrum light bulb at a local hardware store for about $8.  The most commonly available bulb is 1400 lumen.  You would need two of these positioned within 1 meter of yourself to be equivalent to 2,500 lux.  The bulbs can be placed in a standard light fixture.  Additionally, they can be placed in a mountable fixture that is used in greenhouses.  Mountable fixtures are also available at hardware stores for about $9.  Do not look directly at the light.  If you have any concerns regarding the safety of bright light therapy for you, it is recommended that you consult an ophthalmologist before using a light box.  If you have a condition that makes your eyes very sensitive to light, macular degeneration, a family history of such problems, or diabetic changes to your eyes, consult an ophthalmologist before using a light box. If you have anxiety disorder and have an increase in anxiety discontinue use.    Your BMI is Body mass index is 24.83 kg/m .  Weight management is a personal decision.  If you are interested in exploring weight loss strategies, the following discussion covers the approaches that may be successful. Body mass index (BMI) is one way to tell whether you are at a healthy weight, overweight, or obese. It measures your weight in relation to your height.  A BMI of 18.5 to 24.9 is in the healthy range. A person with a BMI of 25 to 29.9 is considered overweight, and someone with a BMI of 30 or greater is considered obese. More than two-thirds of American adults are considered overweight or obese.  Being overweight or obese increases the risk for further weight gain. Excess weight may lead to heart disease and diabetes.  Creating and following plans for healthy eating and physical activity may help you improve your health.  Weight control is part of healthy lifestyle and includes exercise, emotional health, and healthy eating habits. Careful eating  habits lifelong are the mainstay of weight control. Though there are significant health benefits from weight loss, long-term weight loss with diet alone may be very difficult to achieve- studies show long-term success with dietary management in less than 10% of people. Attaining a healthy weight may be especially difficult to achieve in those with severe obesity. In some cases, medications, devices and surgical management might be considered.  What can you do?  If you are overweight or obese and are interested in methods for weight loss, you should discuss this with your provider.     Consider reducing daily calorie intake by 500 calories.     Keep a food journal.     Avoiding skipping meals, consider cutting portions instead.    Diet combined with exercise helps maintain muscle while optimizing fat loss. Strength training is particularly important for building and maintaining muscle mass. Exercise helps reduce stress, increase energy, and improves fitness. Increasing exercise without diet control, however, may not burn enough calories to loose weight.       Start walking three days a week 10-20 minutes at a time    Work towards walking thirty minutes five days a week     Eventually, increase the speed of your walking for 1-2 minutes at time    In addition, we recommend that you review healthy lifestyles and methods for weight loss available through the National Institutes of Health patient information sites:  http://win.niddk.nih.gov/publications/index.htm    And look into health and wellness programs that may be available through your health insurance provider, employer, local community center, or chava club.

## 2019-11-20 NOTE — PROGRESS NOTES
Sleep Follow-Up Visit:    Date on this visit: 11/20/2019    Kristen Hernandes returns for further evaluation of feeling tired upon awakening, feeling unsharp in the day and being told he scares people because it sounds like he stops breathing for 7-10 years. His medical history is significant for depression, anxiety, ADD and several ED presentations for issues regarding alcohol use.     He had a sleep study here on 4/10/2017. His AHI was 2.2/hr, with desaturations down to 90%. He spent 0 minutes below 90% SpO2. RDI 5.1/hr.  REM RDI 21.3/hr.  Supine RDI 5.7/hr. His non-supine RDI was 2.4/hr.  Periodic Limb Movement Index 1.9/hour.   His arousal index was 18.7/hr, which is slightly elevated (15/hr is normal). Most of the arousals were spontaneous.    He felt he slept better and woke less in the sleep lab than he does at home.     He feels things are different than when I saw him 2 years ago. He has more trouble doing simple tasks at work. He says sometimes he can do complex tasks and be fine though. He has been observed to snore and have pauses in breathing terminated by gasps. That was first observed about 5 years ago. He was told he would thrash and wake. In the past 1-1.5 years, he is told he has more dominique pauses in breathing. He feels his sinuses have become worse. He had surgery on his septum and sinuses in 2012. He says his covers are not disheveled when he wakes. His dentist has told him he has a lot of wear on his teeth from bruxing.     His ESS is 14/24. He says he usually has trouble napping. Sometimes he feels his limbs go numb when trying to nap. He has been on Vyvanse for about 5 years. He was on Adderall before that. He felt that made him agitated. He has taken days off of Vyvanse and thinks his energy is worse. He says he did a neuropsych test in [place] and was required to be off of medications for a few days. He was struggling to stay awake. His energy is worse in the morning, but that may be because the  Vyvanse will eventually kick in.  He feels he is still operating on no sleep, but artificially stimulated.     His bedtime is 10 PM and wake time 7 AM during the week. On weekends, he goes to bed at midnight and is up at 10 AM. He still sets an alarm at 10 AM. He feels he could sleep until 3 PM. He does not feel his sleep quality is better on weekends. He has tried sleeping at different durations and times of night. He has not noticed a difference. It takes him 30-45 minutes to fall asleep. He wakes 7-10 times per night for 5-10 minutes. He is not sure why he wakes. He has tried trazodone. It knocked him out but he felt zoned out the next day. He has tried diphenhydramine and had weird dreams. He has sleep paralysis then. He does not have cataplexy, but is on fluoxetine.     His weight is down 8 pounds since his last visit 2 years ago.     Past medical/surgical history, family history, social history, medications and allergies were reviewed.      Problem List:  Patient Active Problem List    Diagnosis Date Noted     ADD (attention deficit disorder) 01/05/2017     Priority: Medium     ROSANA (generalized anxiety disorder) 01/05/2017     Priority: Medium     Moderate episode of recurrent major depressive disorder (H) 01/05/2017     Priority: Medium     Psychophysiological insomnia 01/05/2017     Priority: Medium        Impression/Plan:    (R40.0) Sleepiness  (primary encounter diagnosis), (R06.83) Snoring, (G47.30) Observed sleep apnea  Comment: Mr. Hernandes presents with continued concerns of sleep apnea. He notices worsening fatigue/sleepiness. He does not always fall asleep for naps, which may be a consequence of being on Vyvanse. When off of Vyvanse in the past, he was struggling to stay awake. STOP BANG TOTAL: 5 snoring, tired, observed apnea, neck 40 cm, male gender. Feels symptoms are worse. More dominique pauses in breathing are observed. Energy is worse. PSG in 2017 was normal. Weight is lower now. It seems his risk  for apnea should not be significantly increased, but he is more symptomatic. He would like to do the test at home because he felt he slept better in the lab than at home. He does not feel it was representative. I would recommend an in lab study given his low risk, but his insurance will only cover a home study.   Plan: HST-Home Sleep Apnea Test        Consider trying methylphenidate or dexmethylphenidate instead of Vyvanse if still sleepy in the daytime. We could consider a full sleepiness workup, but he would need to be off of fluoxetine and Vyvanse for a few weeks.     (G47.00) Insomnia, unspecified type  Comment: He is in bed 9-10 hours and his sleep schedule is a couple of hours later on weekends. He has little difficulty falling asleep but wakes frequently. He has tried various sleep times and durations in the past and it did not help. Has tried trazodone but did not like how he felt the next morning. Could be related to anxiety, depression, OCD. He felt he slept better in the sleep lab which may suggests a conditioned insomnia at home. He feels he could sleep until 3 PM and still go to bed at 10 PM.   Plan: He was encouraged to be in bed 11 PM to 7 AM. Keep a consistent sleep schedule on all days. Get bright light in the morning and avoid light in the 30-60 minutes before bedtime. He was a little resistant to doing this as he has tried it in the past without benefit, but I explained this variable needs to be controlled to know if other things help. May try a sleep aid if still noticing awakenings.      He will follow up with me in about 2 week(s).     Forty minutes spent with patient, all of which were spent face-to-face counseling, consulting, coordinating plan of care.      Bennett Goltz, PA-C    CC: Physician No Ref-Primary

## 2019-11-20 NOTE — NURSING NOTE
"Chief Complaint   Patient presents with     Sleep Problem     Tiredness, people telling me I sound like I stop breathing in my sleep        Initial /82   Pulse 61   Resp 16   Ht 1.803 m (5' 11\")   Wt 80.7 kg (178 lb)   SpO2 99%   BMI 24.83 kg/m   Estimated body mass index is 24.83 kg/m  as calculated from the following:    Height as of this encounter: 1.803 m (5' 11\").    Weight as of this encounter: 80.7 kg (178 lb).    Medication Reconciliation: complete    Neck circumference: 15.5 inches / 40 centimeters.    ESS 14    Ani Cano MA      "

## 2019-12-05 ENCOUNTER — OFFICE VISIT (OUTPATIENT)
Dept: SLEEP MEDICINE | Facility: CLINIC | Age: 29
End: 2019-12-05
Payer: COMMERCIAL

## 2019-12-05 DIAGNOSIS — G47.30 OBSERVED SLEEP APNEA: ICD-10-CM

## 2019-12-05 DIAGNOSIS — R40.0 SLEEPINESS: ICD-10-CM

## 2019-12-05 DIAGNOSIS — R06.83 SNORING: ICD-10-CM

## 2019-12-05 PROCEDURE — G0399 HOME SLEEP TEST/TYPE 3 PORTA: HCPCS | Performed by: INTERNAL MEDICINE

## 2019-12-06 ENCOUNTER — DOCUMENTATION ONLY (OUTPATIENT)
Dept: SLEEP MEDICINE | Facility: CLINIC | Age: 29
End: 2019-12-06
Payer: COMMERCIAL

## 2019-12-06 NOTE — PROGRESS NOTES
This HSAT was performed using a Noxturnal T3 device which recorded snore, sound, movement activity, body position, nasal pressure, oronasal thermal airflow, pulse, oximetry and both chest and abdominal respiratory effort. HSAT data was restricted to the time patient states they were in bed.     HSAT was scored using 1B 4% hypopnea rule.     HST AHI (Non-PAT): 3.7  Snoring was reported as mild.  Time with SpO2 below 89% was 0 minutes.   Overall signal quality was good     Pt will follow up with sleep provider to determine appropriate therapy.

## 2019-12-13 ENCOUNTER — VIRTUAL VISIT (OUTPATIENT)
Dept: SLEEP MEDICINE | Facility: CLINIC | Age: 29
End: 2019-12-13
Payer: COMMERCIAL

## 2019-12-13 DIAGNOSIS — R06.83 SNORING: Primary | ICD-10-CM

## 2019-12-13 NOTE — PROGRESS NOTES
This patient did not answer the phone for his scheduled appointment. I called twice at 4:45 and 4:54 PM. I left a message asking for him to call to reschedule.  Bennett Goltz, PA-C          Sleep Follow-Up Virtual Visit:    Date on this visit: 12/13/2019    Kristen Hernandes has a phone visit today for follow-up of his home sleep study done on 12/5/2019. He was initially seen at the [facility] for further evaluation of feeling tired upon awakening, feeling unsharp in the day and being told he scares people because it sounds like he stops breathing for 7-10 years. His medical history is significant for depression, anxiety, ADD and several ED presentations for issues regarding alcohol use.     He had a sleep study here on 4/10/2017. His AHI was 2.2/hr, with desaturations down to 90%. He spent 0 minutes below 90% SpO2. RDI 5.1/hr.  REM RDI 21.3/hr.  Supine RDI 5.7/hr. His non-supine RDI was 2.4/hr.  Periodic Limb Movement Index 1.9/hour.   His arousal index was 18.7/hr, which is slightly elevated (15/hr is normal). Most of the arousals were spontaneous.     Analysis Time:  450.5 minutes     Respiration:   Sleep Associated Hypoxemia: sustained hypoxemia was not present. Baseline oxygen saturation was 95.6%.  Time with saturation less than or equal to 88% was 0 minutes. The lowest oxygen saturation was 91%.   Snoring: Snoring was present.  Respiratory events: The home study revealed a presence of 17 obstructive apneas and 3 mixed and central apneas. There were 8 hypopneas resulting in a combined apnea/hypopnea index [AHI] of 3.7 events per hour.  AHI was 4.6 per hour supine, - per hour prone, 2.0 per hour on left side, and 0.8 per hour on right side.   Pattern: Excluding events noted above, respiratory rate and pattern was Normal.     Position: Percent of time spent: supine - 76%, prone - 0%, on left - 6.7%, on right - 15.8%.     Heart Rate: By pulse oximetry normal rate was noted.     Past medical/surgical  history, family history, social history, medications and allergies were reviewed.      Problem List:  Patient Active Problem List    Diagnosis Date Noted     ADD (attention deficit disorder) 01/05/2017     Priority: Medium     ROSANA (generalized anxiety disorder) 01/05/2017     Priority: Medium     Moderate episode of recurrent major depressive disorder (H) 01/05/2017     Priority: Medium     Psychophysiological insomnia 01/05/2017     Priority: Medium        Impression/Plan:    (R06.83) Snoring  (primary encounter diagnosis)  Comment: no significant apnea  Plan: No treatment necessary          No charge as patient did not answer call.      Bennett Goltz, PA-C    CC: No ref. provider found

## 2019-12-17 ENCOUNTER — VIRTUAL VISIT (OUTPATIENT)
Dept: SLEEP MEDICINE | Facility: CLINIC | Age: 29
End: 2019-12-17
Payer: COMMERCIAL

## 2019-12-17 DIAGNOSIS — R06.83 SNORING: ICD-10-CM

## 2019-12-17 DIAGNOSIS — F41.1 GAD (GENERALIZED ANXIETY DISORDER): ICD-10-CM

## 2019-12-17 DIAGNOSIS — F33.1 MODERATE EPISODE OF RECURRENT MAJOR DEPRESSIVE DISORDER (H): ICD-10-CM

## 2019-12-17 DIAGNOSIS — R40.0 SLEEPINESS: Primary | ICD-10-CM

## 2019-12-17 PROCEDURE — 98967 PH1 ASSMT&MGMT NQHP 11-20: CPT | Performed by: PHYSICIAN ASSISTANT

## 2019-12-17 NOTE — PROGRESS NOTES
Sleep Follow-Up Virtual Visit:     Date on this visit: 12/13/2019     Kristen Hernandes has a phone visit today for follow-up of his home sleep study done on 12/5/2019. He was initially seen at the [facility] for further evaluation of feeling tired upon awakening, feeling unsharp in the day and being told he scares people because it sounds like he stops breathing for 7-10 years. His medical history is significant for depression, anxiety, ADD and several ED presentations for issues regarding alcohol use.     He had a sleep study here on 4/10/2017. His AHI was 2.2/hr, with desaturations down to 90%. He spent 0 minutes below 90% SpO2. RDI 5.1/hr.  REM RDI 21.3/hr.  Supine RDI 5.7/hr. His non-supine RDI was 2.4/hr.  Periodic Limb Movement Index 1.9/hour.   His arousal index was 18.7/hr, which is slightly elevated (15/hr is normal). Most of the arousals were spontaneous.      Analysis Time:  450.5 minutes     Respiration:   Sleep Associated Hypoxemia: sustained hypoxemia was not present. Baseline oxygen saturation was 95.6%.  Time with saturation less than or equal to 88% was 0 minutes. The lowest oxygen saturation was 91%.   Snoring: Snoring was present.  Respiratory events: The home study revealed a presence of 17 obstructive apneas and 3 mixed and central apneas. There were 8 hypopneas resulting in a combined apnea/hypopnea index [AHI] of 3.7 events per hour.  AHI was 4.6 per hour supine, - per hour prone, 2.0 per hour on left side, and 0.8 per hour on right side.   Pattern: Excluding events noted above, respiratory rate and pattern was Normal.     Position: Percent of time spent: supine - 76%, prone - 0%, on left - 6.7%, on right - 15.8%.     Heart Rate: By pulse oximetry normal rate was noted.      Past medical/surgical history, family history, social history, medications and allergies were reviewed.       Problem List:        Patient Active Problem List     Diagnosis Date Noted     ADD (attention  deficit disorder) 01/05/2017       Priority: Medium     ROSANA (generalized anxiety disorder) 01/05/2017       Priority: Medium     Moderate episode of recurrent major depressive disorder (H) 01/05/2017       Priority: Medium     Psychophysiological insomnia 01/05/2017       Priority: Medium         Impression/Plan:      (R40.0) Sleepiness  (primary encounter diagnosis), (R06.83) Snoring    Comment: no significant apnea. Despite 2 studies showing no significant apnea, he still feels apnea may be contributing to his low energy because people have told him he gasps in his sleep. He has been on Vyvanse and Adderall (for ADD), which did help his energy somewhat, but made him more agitated.  Plan: No treatment is indicated for snoring alone, nor will insurances cover treatment. He has to get a bite guard for bruxism, and is interested in pursuing a device for snoring/apnea. I am sending him a referral to dentistry with the understanding that it would not be covered by insurance. I advised him to continue to work on treating his depression and anxiety as that is my best guess as to what is causing his low energy. I suggested he talk to Dr. Monroe Sheppard at [facility] to see if he may be a candidate for transcranial magnetic stimulation. We have talked about sleep scheduling recommendations and treatment for Delayed Sleep Phase, which he has not found very beneficial.        12 minutes was spent with this patient, all in counseling and coordination of care.     Bennett Goltz, PA-C     CC: No ref. provider found